# Patient Record
Sex: MALE | Race: WHITE | NOT HISPANIC OR LATINO | Employment: OTHER | URBAN - METROPOLITAN AREA
[De-identification: names, ages, dates, MRNs, and addresses within clinical notes are randomized per-mention and may not be internally consistent; named-entity substitution may affect disease eponyms.]

---

## 2017-02-10 ENCOUNTER — COMMUNICATION - HEALTHEAST (OUTPATIENT)
Dept: INTERNAL MEDICINE | Facility: CLINIC | Age: 62
End: 2017-02-10

## 2017-02-10 DIAGNOSIS — N52.9 ED (ERECTILE DYSFUNCTION): ICD-10-CM

## 2017-02-10 DIAGNOSIS — G47.00 INSOMNIA: ICD-10-CM

## 2017-02-11 ENCOUNTER — COMMUNICATION - HEALTHEAST (OUTPATIENT)
Dept: INTERNAL MEDICINE | Facility: CLINIC | Age: 62
End: 2017-02-11

## 2017-02-24 ENCOUNTER — COMMUNICATION - HEALTHEAST (OUTPATIENT)
Dept: INTERNAL MEDICINE | Facility: CLINIC | Age: 62
End: 2017-02-24

## 2017-02-24 DIAGNOSIS — I51.9 HEART DISEASE: ICD-10-CM

## 2017-03-01 ENCOUNTER — COMMUNICATION - HEALTHEAST (OUTPATIENT)
Dept: INTERNAL MEDICINE | Facility: CLINIC | Age: 62
End: 2017-03-01

## 2017-03-01 DIAGNOSIS — N52.9 ED (ERECTILE DYSFUNCTION): ICD-10-CM

## 2017-03-20 ENCOUNTER — COMMUNICATION - HEALTHEAST (OUTPATIENT)
Dept: INTERNAL MEDICINE | Facility: CLINIC | Age: 62
End: 2017-03-20

## 2017-03-20 DIAGNOSIS — I20.9 ANGINA PECTORIS (H): ICD-10-CM

## 2017-04-02 ENCOUNTER — COMMUNICATION - HEALTHEAST (OUTPATIENT)
Dept: INTERNAL MEDICINE | Facility: CLINIC | Age: 62
End: 2017-04-02

## 2017-04-02 DIAGNOSIS — N52.9 ED (ERECTILE DYSFUNCTION): ICD-10-CM

## 2017-04-03 ENCOUNTER — RECORDS - HEALTHEAST (OUTPATIENT)
Dept: ADMINISTRATIVE | Facility: OTHER | Age: 62
End: 2017-04-03

## 2017-04-10 ENCOUNTER — RECORDS - HEALTHEAST (OUTPATIENT)
Dept: ADMINISTRATIVE | Facility: OTHER | Age: 62
End: 2017-04-10

## 2017-04-13 ENCOUNTER — OFFICE VISIT - HEALTHEAST (OUTPATIENT)
Dept: INTERNAL MEDICINE | Facility: CLINIC | Age: 62
End: 2017-04-13

## 2017-04-13 ENCOUNTER — RECORDS - HEALTHEAST (OUTPATIENT)
Dept: ADMINISTRATIVE | Facility: OTHER | Age: 62
End: 2017-04-13

## 2017-04-13 DIAGNOSIS — K59.00 CONSTIPATION: ICD-10-CM

## 2017-04-13 DIAGNOSIS — R10.30 LOWER ABDOMINAL PAIN: ICD-10-CM

## 2017-04-13 ASSESSMENT — MIFFLIN-ST. JEOR: SCORE: 1477.99

## 2017-04-15 ENCOUNTER — COMMUNICATION - HEALTHEAST (OUTPATIENT)
Dept: INTERNAL MEDICINE | Facility: CLINIC | Age: 62
End: 2017-04-15

## 2017-04-15 DIAGNOSIS — I51.9 HEART DISEASE: ICD-10-CM

## 2017-04-15 DIAGNOSIS — N52.9 ED (ERECTILE DYSFUNCTION): ICD-10-CM

## 2017-04-26 ENCOUNTER — RECORDS - HEALTHEAST (OUTPATIENT)
Dept: ADMINISTRATIVE | Facility: OTHER | Age: 62
End: 2017-04-26

## 2017-04-26 ENCOUNTER — COMMUNICATION - HEALTHEAST (OUTPATIENT)
Dept: INTERNAL MEDICINE | Facility: CLINIC | Age: 62
End: 2017-04-26

## 2017-04-27 ENCOUNTER — RECORDS - HEALTHEAST (OUTPATIENT)
Dept: ADMINISTRATIVE | Facility: OTHER | Age: 62
End: 2017-04-27

## 2017-04-28 ENCOUNTER — COMMUNICATION - HEALTHEAST (OUTPATIENT)
Dept: INTERNAL MEDICINE | Facility: CLINIC | Age: 62
End: 2017-04-28

## 2017-04-28 DIAGNOSIS — N52.9 ED (ERECTILE DYSFUNCTION): ICD-10-CM

## 2017-05-01 ENCOUNTER — OFFICE VISIT - HEALTHEAST (OUTPATIENT)
Dept: INTERNAL MEDICINE | Facility: CLINIC | Age: 62
End: 2017-05-01

## 2017-05-01 DIAGNOSIS — E04.1 THYROID NODULE: ICD-10-CM

## 2017-05-01 DIAGNOSIS — I10 HTN (HYPERTENSION): ICD-10-CM

## 2017-05-01 DIAGNOSIS — R07.9 CHEST PAIN, UNSPECIFIED: ICD-10-CM

## 2017-05-01 ASSESSMENT — MIFFLIN-ST. JEOR: SCORE: 1473.46

## 2017-05-08 ENCOUNTER — HOSPITAL ENCOUNTER (OUTPATIENT)
Dept: ULTRASOUND IMAGING | Facility: HOSPITAL | Age: 62
Discharge: HOME | End: 2017-05-08
Attending: INTERNAL MEDICINE

## 2017-05-08 DIAGNOSIS — E04.1 THYROID NODULE: ICD-10-CM

## 2017-05-10 ENCOUNTER — COMMUNICATION - HEALTHEAST (OUTPATIENT)
Dept: INTERNAL MEDICINE | Facility: CLINIC | Age: 62
End: 2017-05-10

## 2017-05-24 ENCOUNTER — RECORDS - HEALTHEAST (OUTPATIENT)
Dept: ADMINISTRATIVE | Facility: OTHER | Age: 62
End: 2017-05-24

## 2017-06-20 ENCOUNTER — COMMUNICATION - HEALTHEAST (OUTPATIENT)
Dept: INTERNAL MEDICINE | Facility: CLINIC | Age: 62
End: 2017-06-20

## 2017-06-20 DIAGNOSIS — I51.9 HEART DISEASE: ICD-10-CM

## 2017-06-20 DIAGNOSIS — N52.9 ED (ERECTILE DYSFUNCTION): ICD-10-CM

## 2017-08-08 ENCOUNTER — COMMUNICATION - HEALTHEAST (OUTPATIENT)
Dept: INTERNAL MEDICINE | Facility: CLINIC | Age: 62
End: 2017-08-08

## 2017-08-08 DIAGNOSIS — N52.9 ED (ERECTILE DYSFUNCTION): ICD-10-CM

## 2017-09-13 ENCOUNTER — COMMUNICATION - HEALTHEAST (OUTPATIENT)
Dept: INTERNAL MEDICINE | Facility: CLINIC | Age: 62
End: 2017-09-13

## 2017-09-13 DIAGNOSIS — I51.9 HEART DISEASE: ICD-10-CM

## 2017-09-13 DIAGNOSIS — K59.00 CONSTIPATION: ICD-10-CM

## 2017-09-13 DIAGNOSIS — N52.9 ED (ERECTILE DYSFUNCTION): ICD-10-CM

## 2017-09-13 DIAGNOSIS — G47.00 INSOMNIA: ICD-10-CM

## 2017-11-02 ENCOUNTER — RECORDS - HEALTHEAST (OUTPATIENT)
Dept: ADMINISTRATIVE | Facility: OTHER | Age: 62
End: 2017-11-02

## 2017-12-05 ENCOUNTER — RECORDS - HEALTHEAST (OUTPATIENT)
Dept: ADMINISTRATIVE | Facility: OTHER | Age: 62
End: 2017-12-05

## 2017-12-05 ENCOUNTER — HOSPITAL ENCOUNTER (OUTPATIENT)
Dept: CT IMAGING | Facility: HOSPITAL | Age: 62
Discharge: HOME | End: 2017-12-05
Attending: INTERNAL MEDICINE

## 2017-12-05 DIAGNOSIS — R10.32 LLQ PAIN: ICD-10-CM

## 2018-02-01 ENCOUNTER — COMMUNICATION - HEALTHEAST (OUTPATIENT)
Dept: INTERNAL MEDICINE | Facility: CLINIC | Age: 63
End: 2018-02-01

## 2018-02-01 DIAGNOSIS — N52.9 ED (ERECTILE DYSFUNCTION): ICD-10-CM

## 2018-02-13 ENCOUNTER — RECORDS - HEALTHEAST (OUTPATIENT)
Dept: ADMINISTRATIVE | Facility: OTHER | Age: 63
End: 2018-02-13

## 2018-03-12 ENCOUNTER — RECORDS - HEALTHEAST (OUTPATIENT)
Dept: ADMINISTRATIVE | Facility: OTHER | Age: 63
End: 2018-03-12

## 2018-04-03 ENCOUNTER — COMMUNICATION - HEALTHEAST (OUTPATIENT)
Dept: SCHEDULING | Facility: CLINIC | Age: 63
End: 2018-04-03

## 2018-04-12 ENCOUNTER — RECORDS - HEALTHEAST (OUTPATIENT)
Dept: ADMINISTRATIVE | Facility: OTHER | Age: 63
End: 2018-04-12

## 2018-08-21 ENCOUNTER — RECORDS - HEALTHEAST (OUTPATIENT)
Dept: ADMINISTRATIVE | Facility: OTHER | Age: 63
End: 2018-08-21

## 2018-08-21 ENCOUNTER — COMMUNICATION - HEALTHEAST (OUTPATIENT)
Dept: INTERNAL MEDICINE | Facility: CLINIC | Age: 63
End: 2018-08-21

## 2018-09-04 ENCOUNTER — COMMUNICATION - HEALTHEAST (OUTPATIENT)
Dept: INTERNAL MEDICINE | Facility: CLINIC | Age: 63
End: 2018-09-04

## 2018-10-17 ENCOUNTER — COMMUNICATION - HEALTHEAST (OUTPATIENT)
Dept: INTERNAL MEDICINE | Facility: CLINIC | Age: 63
End: 2018-10-17

## 2018-10-19 RX ORDER — SILDENAFIL CITRATE 20 MG/1
TABLET ORAL
Qty: 30 TABLET | Refills: 0 | Status: SHIPPED | OUTPATIENT
Start: 2018-10-19 | End: 2023-03-20

## 2018-11-12 ENCOUNTER — COMMUNICATION - HEALTHEAST (OUTPATIENT)
Dept: INTERNAL MEDICINE | Facility: CLINIC | Age: 63
End: 2018-11-12

## 2018-11-13 ENCOUNTER — COMMUNICATION - HEALTHEAST (OUTPATIENT)
Dept: INTERNAL MEDICINE | Facility: CLINIC | Age: 63
End: 2018-11-13

## 2018-11-15 RX ORDER — DOCUSATE SODIUM 100 MG/1
CAPSULE, LIQUID FILLED ORAL
Qty: 60 CAPSULE | Refills: 0 | Status: SHIPPED | OUTPATIENT
Start: 2018-11-15 | End: 2023-03-20

## 2018-11-18 ENCOUNTER — COMMUNICATION - HEALTHEAST (OUTPATIENT)
Dept: INTERNAL MEDICINE | Facility: CLINIC | Age: 63
End: 2018-11-18

## 2018-11-19 ENCOUNTER — COMMUNICATION - HEALTHEAST (OUTPATIENT)
Dept: INTERNAL MEDICINE | Facility: CLINIC | Age: 63
End: 2018-11-19

## 2018-11-20 RX ORDER — NITROGLYCERIN 0.4 MG/1
0.4 TABLET SUBLINGUAL EVERY 5 MIN PRN
Qty: 100 TABLET | Refills: 0 | Status: SHIPPED | OUTPATIENT
Start: 2018-11-20

## 2018-11-30 ENCOUNTER — COMMUNICATION - HEALTHEAST (OUTPATIENT)
Dept: INTERNAL MEDICINE | Facility: CLINIC | Age: 63
End: 2018-11-30

## 2018-12-03 RX ORDER — FUROSEMIDE 20 MG
TABLET ORAL
Qty: 30 TABLET | Refills: 0 | Status: SHIPPED | OUTPATIENT
Start: 2018-12-03

## 2019-01-30 ENCOUNTER — COMMUNICATION - HEALTHEAST (OUTPATIENT)
Dept: INTERNAL MEDICINE | Facility: CLINIC | Age: 64
End: 2019-01-30

## 2019-03-06 ENCOUNTER — COMMUNICATION - HEALTHEAST (OUTPATIENT)
Dept: INTERNAL MEDICINE | Facility: CLINIC | Age: 64
End: 2019-03-06

## 2019-03-13 RX ORDER — TRAZODONE HYDROCHLORIDE 50 MG/1
TABLET, FILM COATED ORAL
Qty: 30 TABLET | Refills: 0 | Status: SHIPPED | OUTPATIENT
Start: 2019-03-13 | End: 2023-03-20

## 2020-02-18 ENCOUNTER — COMMUNICATION - HEALTHEAST (OUTPATIENT)
Dept: INTERNAL MEDICINE | Facility: CLINIC | Age: 65
End: 2020-02-18

## 2020-08-26 ENCOUNTER — AMBULATORY - HEALTHEAST (OUTPATIENT)
Dept: SURGERY | Facility: CLINIC | Age: 65
End: 2020-08-26

## 2020-08-26 ENCOUNTER — RECORDS - HEALTHEAST (OUTPATIENT)
Dept: ADMINISTRATIVE | Facility: OTHER | Age: 65
End: 2020-08-26

## 2020-08-26 DIAGNOSIS — Z11.59 ENCOUNTER FOR SCREENING FOR OTHER VIRAL DISEASES: ICD-10-CM

## 2021-05-26 ENCOUNTER — RECORDS - HEALTHEAST (OUTPATIENT)
Dept: ADMINISTRATIVE | Facility: CLINIC | Age: 66
End: 2021-05-26

## 2021-05-27 ENCOUNTER — RECORDS - HEALTHEAST (OUTPATIENT)
Dept: ADMINISTRATIVE | Facility: CLINIC | Age: 66
End: 2021-05-27

## 2021-05-28 ENCOUNTER — RECORDS - HEALTHEAST (OUTPATIENT)
Dept: ADMINISTRATIVE | Facility: CLINIC | Age: 66
End: 2021-05-28

## 2021-05-30 ENCOUNTER — RECORDS - HEALTHEAST (OUTPATIENT)
Dept: ADMINISTRATIVE | Facility: CLINIC | Age: 66
End: 2021-05-30

## 2021-05-30 VITALS — WEIGHT: 169 LBS | HEIGHT: 65 IN | BODY MASS INDEX: 28.16 KG/M2

## 2021-05-30 VITALS — WEIGHT: 170 LBS | BODY MASS INDEX: 28.32 KG/M2 | HEIGHT: 65 IN

## 2021-05-31 ENCOUNTER — RECORDS - HEALTHEAST (OUTPATIENT)
Dept: ADMINISTRATIVE | Facility: CLINIC | Age: 66
End: 2021-05-31

## 2021-06-06 NOTE — TELEPHONE ENCOUNTER
RN cannot approve Refill Request    RN can NOT refill this medication Protocol failed and NO refill given.         Denisse Fountain, [facility] Triage/Med Refill 2/20/2020    Requested Prescriptions   Pending Prescriptions Disp Refills     traZODone (DESYREL) 50 MG tablet [Pharmacy Med Name: TRAZODONE 50 MG TABLET] 30 tablet 0     Sig: TAKE 1 TABLET BY MOUTH AT BEDTIME AS NEEDED       Tricyclics/Misc Antidepressant/Antianxiety Meds Refill Protocol Failed - 2/18/2020  9:19 AM        Failed - PCP or prescribing provider visit in last year     Last office visit with prescriber/PCP: 5/1/2017 Shola Adkins MD OR same dept: Visit date not found OR same specialty: 5/1/2017 Shola Adkins MD  Last physical: Visit date not found Last MTM visit: Visit date not found   Next visit within 3 mo: Visit date not found  Next physical within 3 mo: Visit date not found  Prescriber OR PCP: Shola Adkins MD  Last diagnosis associated with med order: There are no diagnoses linked to this encounter.  If protocol passes may refill for 12 months if within 3 months of last provider visit (or a total of 15 months).

## 2021-06-10 NOTE — PROGRESS NOTES
[facility] Follow Up Note    Ayan Watkins   62 y.o. male    Date of Visit: 4/13/2017    Chief Complaint   Patient presents with     Follow-up     having problems with bowel     Subjective  This is a 62-year-old patient who comes in primarily because of progressively worsening constipation and lower abdominal pain.  He tells me that his symptoms actually began  about 5-6 months ago.  He thinks that the symptoms have gradually worsened.  He is now having significant problems with bowel movement even with stool softeners and laxatives.  He has not noticed any black stool or blood.  He is about 9-1/2 years since his last colonoscopy.  The abdominal discomfort is primarily in the lower abdomen bilaterally.  No other GI symptoms.    He does have known coronary disease and was seen by his cardiologist this morning.  They recommended he concentrate on losing more weight and also set up a stress test.  3 days ago he was in the emergency room with a possible foreign body in the finger.  The evaluation was negative but they placed him on an antibiotic to be on the safe side.  The finger is feeling better.    ROS A comprehensive review of systems was performed and was otherwise negative    Medications, allergies, and problem list were reviewed and updated    Exam  General Appearance:   On examination his blood pressure is 124/80.  Weight is 170 pounds and height is 65 inches.  BMI is 28.29.    Heart is in a sinus rhythm with a rate of 90 and no ectopy.    Abdomen is soft with some vague tenderness in the lower abdomen bilaterally.  No obvious masses.  No distention.    The patient is alert and oriented ×3.      Assessment/Plan  1. Constipation  Ambulatory referral for Colonoscopy   2. Lower abdominal pain  Ambulatory referral for Colonoscopy     Increased constipation and lower abdominal discomfort.  Given the situation I would recommend we go ahead with a colonoscopy now instead of waiting to  the end of the year.  We will try to schedule that and follow-up with him regarding the results.  The following high BMI interventions were performed this visit: weight monitoring    Shola Adkins MD      Current Outpatient Prescriptions on File Prior to Visit   Medication Sig     alfuzosin (UROXATRAL) 10 mg 24 hr tablet      aspirin 325 MG EC tablet      CRESTOR 5 mg tablet      DOC-Q-LACE 100 mg capsule TAKE ONE CAPSULE BY MOUTH TWO TIMES A DAY.     furosemide (LASIX) 20 MG tablet TAKE ONE TABLET BY MOUTH DAILY     metoclopramide (REGLAN) 10 MG tablet      MULTIVITS W-FE,OTHER MIN/LUT (CENTRUM SILVER ULTRA WOMEN'S ORAL) Take 1 tablet by mouth daily.     NITROSTAT 0.4 mg SL tablet TAKE 1 TABLET, SUBLINGUALLY, EVERY 5 MINUTES AS NEEDED     omega-3 acid ethyl esters (LOVAZA) 1 gram capsule TAKE 1 CAPSULE TWICE DAILY WITH MEALS     omeprazole (PRILOSEC) 20 MG capsule TAKE 1 CAPSULE IN THE MORNING, 20 MINUTES PRE-MEAL.     PRALUENT PEN 75 mg/mL PnIj      pramoxine-hydrocortisone (ANALPRAM HC) cream APPLY TOPICALLY TO THE AFFECTED AREA A THIN LAYER 2-3 TIMES A DAY AS NEEDED     RAPAFLO 8 mg cap      sildenafil (REVATIO) 20 mg tablet TAKE 2 TO 5 TABLETS 1 HOUR PRIOR, NO MORE THAN ONCE PER 24 HOURS.     tamsulosin (FLOMAX) 0.4 mg Cp24 Take 1 capsule (0.4 mg total) by mouth Daily after breakfast.     traMADol (ULTRAM) 50 mg tablet TAKE 1 TABLET EVERY 6 HOURS AS NEEDED     traZODone (DESYREL) 50 MG tablet TAKE 1 TABLET BY MOUTH AT BEDTIME     [DISCONTINUED] losartan (COZAAR) 25 MG tablet      No current facility-administered medications on file prior to visit.      Allergies   Allergen Reactions     Atorvastatin Myalgia     foot and ankle pain     Influenza Virus Vaccines Unknown     Pt. unaware of reaction.      Metoprolol Unknown     Fatigue, tolerated for CT coronary angiogram 5/10/16     Ondansetron Hives     When given IV     Social History   Substance Use Topics     Smoking status: Never Smoker     Smokeless  tobacco: Never Used     Alcohol use None      Comment: 2-3 drinks per week

## 2021-06-10 NOTE — PROGRESS NOTES
[facility] Follow Up Note    Ayan Watkins   62 y.o. male    Date of Visit: 5/1/2017    Chief Complaint   Patient presents with     Follow-up     blood presure     Subjective  This is a 62-year-old patient who is in for follow-up to a recent ER visit made at [facility].  He went in with sudden onset of some chest discomfort.  He has had a history of coronary disease and was also noted to have marked elevation of his blood pressure.  I have had the opportunity to review the emergency department notes and the workup was mostly unremarkable.  There was no evidence of any acute cardiac syndrome.  It was noted at that time and confirmed by the patient today that he had been off of his blood pressure medications for about 3 days prior to doing a colonoscopy.  He has now been back on his medication.  We are awaiting the results from the colonoscopy.  During the course of the emergency room evaluation a scan was done which revealed a nodule in the thyroid area and it was suggested that he have an ultrasound done.  He has no particular symptoms relative to the thyroid.  He is otherwise feeling pretty much his usual self again.    ROS A comprehensive review of systems was performed and was otherwise negative    Medications, allergies, and problem list were reviewed and updated    Exam  General Appearance:   On examination today his blood pressure is improved at 146/80.  Weight is 169 pounds and height is 65 inches.  BMI is 28.12.    Lungs are clear.    Heart is in a sinus rhythm with a rate of 108 and no ectopy.    No abnormalities palpated over the thyroid gland.    The patient is alert and oriented ×3.      Assessment/Plan  1. HTN (hypertension)     2. Chest Pain     3. Thyroid nodule  US Thyroid     Chest pain.  ER evaluation was negative.  This may have been related to stress and his elevated blood pressure.  He is now asymptomatic.    Hypertension.  I suspect the elevated  blood pressure was due to his being off of medications.  It is now back down and he will continue his usual medicines.    Thyroid nodule on scan.  We will do an ultrasound of the thyroid and follow-up with him regarding the results.    We will also follow-up with him regarding the colonoscopy results.    Total time of this office visit was 25 minutes with greater than 50% of the time spent in care coordination and patient counseling.  The following high BMI interventions were performed this visit: weight monitoring    Shola Adkins MD      Current Outpatient Prescriptions on File Prior to Visit   Medication Sig     alfuzosin (UROXATRAL) 10 mg 24 hr tablet      aspirin 325 MG EC tablet      CRESTOR 5 mg tablet      DOC-Q-LACE 100 mg capsule TAKE ONE CAPSULE BY MOUTH TWO TIMES A DAY.     furosemide (LASIX) 20 MG tablet TAKE ONE TABLET BY MOUTH DAILY     losartan (COZAAR) 50 MG tablet Take 50 mg by mouth once daily.     metoclopramide (REGLAN) 10 MG tablet      multivitamin (ONE A DAY) per tablet Take 1 tablet by mouth.     MULTIVITS W-FE,OTHER MIN/LUT (CENTRUM SILVER ULTRA WOMEN'S ORAL) Take 1 tablet by mouth daily.     NITROSTAT 0.4 mg SL tablet TAKE 1 TABLET, SUBLINGUALLY, EVERY 5 MINUTES AS NEEDED     omega-3 acid ethyl esters (LOVAZA) 1 gram capsule TAKE 1 CAPSULE TWICE DAILY WITH MEALS     omeprazole (PRILOSEC) 20 MG capsule TAKE 1 CAPSULE IN THE MORNING, 20 MINUTES PRE-MEAL.     PRALUENT PEN 75 mg/mL PnIj      pramoxine-hydrocortisone (ANALPRAM HC) cream APPLY TOPICALLY TO THE AFFECTED AREA A THIN LAYER 2-3 TIMES A DAY AS NEEDED     RAPAFLO 8 mg cap      sildenafil (REVATIO) 20 mg tablet TAKE 2 TO 5 TABLETS 1 HOUR PRIOR, NO MORE THAN ONCE PER 24 HOURS.     tamsulosin (FLOMAX) 0.4 mg Cp24 Take 1 capsule (0.4 mg total) by mouth Daily after breakfast.     traMADol (ULTRAM) 50 mg tablet TAKE 1 TABLET EVERY 6 HOURS AS NEEDED     traZODone (DESYREL) 50 MG tablet TAKE 1 TABLET BY MOUTH AT BEDTIME     No current  facility-administered medications on file prior to visit.      Allergies   Allergen Reactions     Atorvastatin Myalgia     foot and ankle pain     Influenza Virus Vaccines Unknown     Pt. unaware of reaction.      Metoprolol Unknown     Fatigue, tolerated for CT coronary angiogram 5/10/16     Ondansetron Hives     When given IV     Social History   Substance Use Topics     Smoking status: Never Smoker     Smokeless tobacco: Never Used     Alcohol use Not on file      Comment: 2-3 drinks per week

## 2021-06-16 PROBLEM — R00.0 TACHYCARDIA: Status: ACTIVE | Noted: 2018-04-03

## 2021-06-16 PROBLEM — R19.7 NAUSEA VOMITING AND DIARRHEA: Status: ACTIVE | Noted: 2018-04-03

## 2021-06-16 PROBLEM — R11.2 NAUSEA VOMITING AND DIARRHEA: Status: ACTIVE | Noted: 2018-04-03

## 2021-06-16 PROBLEM — R50.9 FEBRILE ILLNESS, ACUTE: Status: ACTIVE | Noted: 2018-04-04

## 2021-06-16 PROBLEM — I10 BENIGN ESSENTIAL HTN: Status: ACTIVE | Noted: 2018-04-03

## 2021-06-16 PROBLEM — D69.6 THROMBOCYTOPENIA (H): Status: ACTIVE | Noted: 2018-04-04

## 2021-06-16 PROBLEM — R65.10 SIRS (SYSTEMIC INFLAMMATORY RESPONSE SYNDROME) (H): Status: ACTIVE | Noted: 2018-04-04

## 2021-06-24 NOTE — TELEPHONE ENCOUNTER
RN cannot approve Refill Request    RN can NOT refill this medication overdue for office visits and/or labs.    Ayan Mariano, [facility] Triage/Med Refill 3/12/2019    Requested Prescriptions   Pending Prescriptions Disp Refills     traZODone (DESYREL) 50 MG tablet [Pharmacy Med Name: TRAZODONE 50 MG TABLET] 30 tablet 0     Sig: TAKE 1 TABLET BY MOUTH AT BEDTIME AS NEEDED    Tricyclics/Misc Antidepressant/Antianxiety Meds Refill Protocol Failed - 3/6/2019  1:38 PM       Failed - PCP or prescribing provider visit in last year    Last office visit with prescriber/PCP: 11/22/2016 Daniel Lin MD OR same dept: Visit date not found OR same specialty: 5/1/2017 Shola Adkins MD  Last physical: Visit date not found Last MTM visit: Visit date not found   Next visit within 3 mo: Visit date not found  Next physical within 3 mo: Visit date not found  Prescriber OR PCP: Daniel Lin MD  Last diagnosis associated with med order: There are no diagnoses linked to this encounter.  If protocol passes may refill for 12 months if within 3 months of last provider visit (or a total of 15 months).

## 2022-01-10 ENCOUNTER — TRANSFERRED RECORDS (OUTPATIENT)
Dept: HEALTH INFORMATION MANAGEMENT | Facility: CLINIC | Age: 67
End: 2022-01-10
Payer: COMMERCIAL

## 2022-02-12 ENCOUNTER — APPOINTMENT (OUTPATIENT)
Dept: CT IMAGING | Facility: HOSPITAL | Age: 67
End: 2022-02-12
Attending: FAMILY MEDICINE
Payer: COMMERCIAL

## 2022-02-12 ENCOUNTER — HOSPITAL ENCOUNTER (EMERGENCY)
Facility: HOSPITAL | Age: 67
Discharge: HOME | End: 2022-02-12
Attending: FAMILY MEDICINE | Admitting: FAMILY MEDICINE
Payer: COMMERCIAL

## 2022-02-12 VITALS
RESPIRATION RATE: 36 BRPM | SYSTOLIC BLOOD PRESSURE: 146 MMHG | BODY MASS INDEX: 29.99 KG/M2 | WEIGHT: 180 LBS | HEIGHT: 65 IN | OXYGEN SATURATION: 95 % | TEMPERATURE: 98.1 F | HEART RATE: 102 BPM | DIASTOLIC BLOOD PRESSURE: 95 MMHG

## 2022-02-12 DIAGNOSIS — R06.02 SHORTNESS OF BREATH: ICD-10-CM

## 2022-02-12 DIAGNOSIS — F10.920 ALCOHOLIC INTOXICATION WITHOUT COMPLICATION (H): ICD-10-CM

## 2022-02-12 LAB
ANION GAP SERPL CALCULATED.3IONS-SCNC: 16 MMOL/L (ref 5–18)
BASE EXCESS BLDV CALC-SCNC: -4.8 MMOL/L
BASOPHILS # BLD AUTO: 0.1 10E3/UL (ref 0–0.2)
BASOPHILS NFR BLD AUTO: 1 %
BNP SERPL-MCNC: 13 PG/ML (ref 0–60)
BUN SERPL-MCNC: 16 MG/DL (ref 8–22)
CALCIUM SERPL-MCNC: 8.8 MG/DL (ref 8.5–10.5)
CHLORIDE BLD-SCNC: 105 MMOL/L (ref 98–107)
CO2 SERPL-SCNC: 19 MMOL/L (ref 22–31)
CREAT SERPL-MCNC: 0.77 MG/DL (ref 0.7–1.3)
EOSINOPHIL # BLD AUTO: 0.1 10E3/UL (ref 0–0.7)
EOSINOPHIL NFR BLD AUTO: 1 %
ERYTHROCYTE [DISTWIDTH] IN BLOOD BY AUTOMATED COUNT: 13.9 % (ref 10–15)
ETHANOL SERPL-MCNC: 157 MG/DL
FLUAV RNA SPEC QL NAA+PROBE: NEGATIVE
FLUBV RNA RESP QL NAA+PROBE: NEGATIVE
GFR SERPL CREATININE-BSD FRML MDRD: >90 ML/MIN/1.73M2
GLUCOSE BLD-MCNC: 124 MG/DL (ref 70–125)
HCO3 BLDV-SCNC: 21 MMOL/L (ref 24–30)
HCT VFR BLD AUTO: 47.2 % (ref 40–53)
HGB BLD-MCNC: 16 G/DL (ref 13.3–17.7)
HOLD SPECIMEN: NORMAL
IMM GRANULOCYTES # BLD: 0.2 10E3/UL
IMM GRANULOCYTES NFR BLD: 2 %
LYMPHOCYTES # BLD AUTO: 2.2 10E3/UL (ref 0.8–5.3)
LYMPHOCYTES NFR BLD AUTO: 26 %
MAGNESIUM SERPL-MCNC: 2.3 MG/DL (ref 1.8–2.6)
MCH RBC QN AUTO: 31.1 PG (ref 26.5–33)
MCHC RBC AUTO-ENTMCNC: 33.9 G/DL (ref 31.5–36.5)
MCV RBC AUTO: 92 FL (ref 78–100)
MONOCYTES # BLD AUTO: 0.8 10E3/UL (ref 0–1.3)
MONOCYTES NFR BLD AUTO: 10 %
NEUTROPHILS # BLD AUTO: 5.2 10E3/UL (ref 1.6–8.3)
NEUTROPHILS NFR BLD AUTO: 60 %
NRBC # BLD AUTO: 0 10E3/UL
NRBC BLD AUTO-RTO: 0 /100
OXYHGB MFR BLDV: 95.8 % (ref 70–75)
PCO2 BLDV: 30 MM HG (ref 35–50)
PH BLDV: 7.43 [PH] (ref 7.35–7.45)
PLATELET # BLD AUTO: 175 10E3/UL (ref 150–450)
PO2 BLDV: 84 MM HG (ref 25–47)
POTASSIUM BLD-SCNC: 3.9 MMOL/L (ref 3.5–5)
RBC # BLD AUTO: 5.15 10E6/UL (ref 4.4–5.9)
SAO2 % BLDV: 96.5 % (ref 70–75)
SARS-COV-2 RNA RESP QL NAA+PROBE: NEGATIVE
SODIUM SERPL-SCNC: 140 MMOL/L (ref 136–145)
TROPONIN I SERPL-MCNC: 0.02 NG/ML (ref 0–0.29)
TROPONIN I SERPL-MCNC: <0.01 NG/ML (ref 0–0.29)
WBC # BLD AUTO: 8.4 10E3/UL (ref 4–11)

## 2022-02-12 PROCEDURE — 250N000011 HC RX IP 250 OP 636: Performed by: FAMILY MEDICINE

## 2022-02-12 PROCEDURE — 83735 ASSAY OF MAGNESIUM: CPT | Performed by: FAMILY MEDICINE

## 2022-02-12 PROCEDURE — 87636 SARSCOV2 & INF A&B AMP PRB: CPT | Performed by: FAMILY MEDICINE

## 2022-02-12 PROCEDURE — C9803 HOPD COVID-19 SPEC COLLECT: HCPCS

## 2022-02-12 PROCEDURE — 36415 COLL VENOUS BLD VENIPUNCTURE: CPT | Performed by: STUDENT IN AN ORGANIZED HEALTH CARE EDUCATION/TRAINING PROGRAM

## 2022-02-12 PROCEDURE — 85025 COMPLETE CBC W/AUTO DIFF WBC: CPT | Performed by: FAMILY MEDICINE

## 2022-02-12 PROCEDURE — 80048 BASIC METABOLIC PNL TOTAL CA: CPT | Performed by: FAMILY MEDICINE

## 2022-02-12 PROCEDURE — 82805 BLOOD GASES W/O2 SATURATION: CPT | Performed by: FAMILY MEDICINE

## 2022-02-12 PROCEDURE — 84484 ASSAY OF TROPONIN QUANT: CPT | Performed by: FAMILY MEDICINE

## 2022-02-12 PROCEDURE — 99285 EMERGENCY DEPT VISIT HI MDM: CPT | Mod: 25

## 2022-02-12 PROCEDURE — 36415 COLL VENOUS BLD VENIPUNCTURE: CPT | Performed by: FAMILY MEDICINE

## 2022-02-12 PROCEDURE — 83880 ASSAY OF NATRIURETIC PEPTIDE: CPT | Performed by: FAMILY MEDICINE

## 2022-02-12 PROCEDURE — 84484 ASSAY OF TROPONIN QUANT: CPT | Mod: 91 | Performed by: FAMILY MEDICINE

## 2022-02-12 PROCEDURE — 71275 CT ANGIOGRAPHY CHEST: CPT

## 2022-02-12 PROCEDURE — 82077 ASSAY SPEC XCP UR&BREATH IA: CPT | Performed by: FAMILY MEDICINE

## 2022-02-12 RX ORDER — IOPAMIDOL 755 MG/ML
100 INJECTION, SOLUTION INTRAVASCULAR ONCE
Status: COMPLETED | OUTPATIENT
Start: 2022-02-12 | End: 2022-02-12

## 2022-02-12 RX ADMIN — IOPAMIDOL 100 ML: 755 INJECTION, SOLUTION INTRAVENOUS at 02:56

## 2022-02-12 ASSESSMENT — MIFFLIN-ST. JEOR: SCORE: 1523.35

## 2022-02-12 NOTE — ED PROVIDER NOTES
EMERGENCY DEPARTMENT ENCOUNTER      NAME: Ayan Watkins  AGE: 66 year old male  YOB: 1955  MRN: [id number]  EVALUATION DATE & TIME: 2/12/2022  1:01 AM    PCP: Tavo Stout    ED PROVIDER: Boom Diamond M.D.    Chief Complaint   Patient presents with     Shortness of Breath       FINAL IMPRESSION:  1. Shortness of breath    2. Alcoholic intoxication without complication (H)        ED COURSE & MEDICAL DECISION MAKING:    Pertinent Labs & Imaging studies personally reviewed and interpreted by me. (See chart for details)  1:10 AM Patient seen and examined, prior records reviewed.  Differential diagnosis includes but not limited to COPD, asthma, CHF, pneumonia, bronchitis, pneumothorax, myocardial infarction, pulmonary embolism, anxiety.  Patient with shortness of breath about 90 minutes prior to arrival, no accompanying symptoms.  In the emergency department, tachycardic but no respiratory distress or increased work of breathing.  Poor air movement bilaterally but without wheezes or crackles.  Given tachycardia and abrupt onset, CT PE is ordered.  2:24 AM labs so far reassuring, CBC does not have any acute abnormalities, venous gas with pH of 7.43 and PCO2 of 30.  Covid and influenza negative.  Remaining labs are pending, when these are back patient will be sent to CT.  Heart rate has improved.  3:47 AM patient remains comfortable.  CT scan of the chest is negative for acute intrathoracic findings, no pulmonary embolism.  Troponin 0 0.02, we will recheck to make sure that his shortness of breath is not an anginal equivalent.  Otherwise, will wean oxygen if patient remains comfortable can be discharged.  Patient feels better, resting comfortably.  Oxygen was turned off and will continue to monitor.  Repeat troponin ordered.  4:58 AM I updated the patient with his results and discussed discharge plans along with return precautions. Patient was agreeable with plan.  Patient with shortness of  breath, no hypoxia, lungs clear.  No findings for acute coronary syndrome, myocardial infarction, pulmonary embolism, pneumonia, bronchitis, heart failure, pericardial effusion.  Given absence of hypoxemia normal lung sounds on initial exam, certainly this could be related to anxiety plus alcohol intoxication, no indication of acute emergent cardiopulmonary condition.       At the conclusion of the encounter I discussed the results of all of the tests and the disposition. The questions were answered. The patient or family acknowledged understanding and was agreeable with the care plan.     PROCEDURES:   Procedures    MEDICATIONS GIVEN IN THE EMERGENCY:  Medications   iopamidol (ISOVUE-370) solution 100 mL (100 mLs Intravenous Given 2/12/22 0256)       NEW PRESCRIPTIONS STARTED AT TODAY'S ER VISIT  Discharge Medication List as of 2/12/2022  4:53 AM          =================================================================    HPI    Patient information was obtained from: patient       Ayan Watkins is a 66 year old male with a pertinent history of CAD, hyperlipidemia, hypertension, MI, who presents to this ED via EMS for evaluation of shortness of breath.  Patient had abrupt onset of shortness of breath while he is laying down about 90 minutes prior to coming the emergency department.  No cough, fever, runny nose.  No chest pain.  No leg swelling.  No nausea, vomiting, diarrhea, or abdominal pain.  Took 2 nitroglycerin with no relief.  Patient is primary caregiver from his mother who has had a stroke, also working.  Denies smoking, denies recent illness.    Review of care everywhere shows the patient had recent evaluation for dyspnea on exertion at [facility], echocardiogram demonstrated mild increased wall thickness and mild global systolic dysfunction with hypokinesis of the anterior and mid septum.  Coronary CTA without evidence of acute coronary syndrome.    REVIEW OF SYSTEMS   Review of Systems   All other  systems reviewed and negative    PAST MEDICAL HISTORY:  History reviewed. No pertinent past medical history.    PAST SURGICAL HISTORY:  History reviewed. No pertinent surgical history.    CURRENT MEDICATIONS:    No current facility-administered medications for this encounter.     Current Outpatient Medications   Medication     acetaminophen (TYLENOL) 325 MG tablet     aspirin 81 MG EC tablet     docusate sodium (COLACE) 100 MG capsule     furosemide (LASIX) 20 MG tablet     losartan (COZAAR) 100 MG tablet     multivitamin (ONE A DAY) per tablet     nitroglycerin (NITROSTAT) 0.4 MG SL tablet     omeprazole (PRILOSEC) 20 MG capsule     PRALUENT PEN 75 mg/mL PnIj     rosuvastatin (CRESTOR) 5 MG tablet     sildenafil, antihypertensive, (REVATIO) 20 mg tablet     traZODone (DESYREL) 50 MG tablet       ALLERGIES:  Allergies   Allergen Reactions     Atorvastatin Muscle Pain (Myalgia)     foot and ankle pain     Influenza Virus Vaccines [Influenza Vaccines] Unknown     Pt. unaware of reaction.      Metoprolol Unknown     Fatigue, tolerated for CT coronary angiogram 5/10/16     Ondansetron Hives     When given IV       FAMILY HISTORY:  History reviewed. No pertinent family history.    SOCIAL HISTORY:   Social History     Socioeconomic History     Marital status: Single     Spouse name: Not on file     Number of children: Not on file     Years of education: Not on file     Highest education level: Not on file   Occupational History     Not on file   Tobacco Use     Smoking status: Never Smoker     Smokeless tobacco: Never Used   Substance and Sexual Activity     Alcohol use: Yes     Comment: Alcoholic Drinks/day: 2-3 drinks per week     Drug use: No     Sexual activity: Not Currently   Other Topics Concern     Not on file   Social History Narrative     Not on file     Social Determinants of Health     Financial Resource Strain: Not on file   Food Insecurity: Not on file   Transportation Needs: Not on file   Physical Activity:  "Not on file   Stress: Not on file   Social Connections: Not on file   Intimate Partner Violence: Not on file   Housing Stability: Not on file       VITALS:  BP (!) 146/95   Pulse 102   Temp 98.1  F (36.7  C) (Oral)   Resp (!) 36   Ht 1.651 m (5' 5\")   Wt 81.6 kg (180 lb)   SpO2 95%   BMI 29.95 kg/m      PHYSICAL EXAM:  Physical Exam  Vitals and nursing note reviewed.   Constitutional:       Appearance: Normal appearance.   HENT:      Head: Normocephalic and atraumatic.      Right Ear: External ear normal.      Left Ear: External ear normal.      Nose: Nose normal.      Mouth/Throat:      Mouth: Mucous membranes are moist.   Eyes:      Extraocular Movements: Extraocular movements intact.      Conjunctiva/sclera: Conjunctivae normal.      Pupils: Pupils are equal, round, and reactive to light.   Cardiovascular:      Rate and Rhythm: Normal rate and regular rhythm.   Pulmonary:      Effort: Pulmonary effort is normal.      Breath sounds: Decreased breath sounds present. No wheezing or rales.   Abdominal:      General: Abdomen is flat. There is no distension.      Palpations: Abdomen is soft.      Tenderness: There is no abdominal tenderness. There is no guarding.   Musculoskeletal:         General: Normal range of motion.      Cervical back: Normal range of motion and neck supple.      Right lower leg: No edema.      Left lower leg: No edema.   Lymphadenopathy:      Cervical: No cervical adenopathy.   Skin:     General: Skin is warm and dry.   Neurological:      General: No focal deficit present.      Mental Status: He is alert and oriented to person, place, and time. Mental status is at baseline.      Comments: No gross focal neurologic deficits   Psychiatric:         Mood and Affect: Mood normal.         Behavior: Behavior normal.         Thought Content: Thought content normal.          LAB:  All pertinent labs reviewed and interpreted.  Results for orders placed or performed during the hospital encounter of " 02/12/22   CT Chest Pulmonary Embolism w Contrast    Impression    IMPRESSION:  1.  No pulmonary embolus, aortic dissection, or aneurysm.  2.   Status post median sternotomy and CABG.  3.  Fatty liver.   Extra Blue Top Tube   Result Value Ref Range    Hold Specimen JIC    Extra Red Top Tube   Result Value Ref Range    Hold Specimen JIC    Extra Green Top (Lithium Heparin) Tube   Result Value Ref Range    Hold Specimen JIC    Extra Purple Top Tube   Result Value Ref Range    Hold Specimen JIC    Extra Green Top (Lithium Heparin) ON ICE   Result Value Ref Range    Hold Specimen JIC    Basic metabolic panel   Result Value Ref Range    Sodium 140 136 - 145 mmol/L    Potassium 3.9 3.5 - 5.0 mmol/L    Chloride 105 98 - 107 mmol/L    Carbon Dioxide (CO2) 19 (L) 22 - 31 mmol/L    Anion Gap 16 5 - 18 mmol/L    Urea Nitrogen 16 8 - 22 mg/dL    Creatinine 0.77 0.70 - 1.30 mg/dL    Calcium 8.8 8.5 - 10.5 mg/dL    Glucose 124 70 - 125 mg/dL    GFR Estimate >90 >60 mL/min/1.73m2   Result Value Ref Range    Magnesium 2.3 1.8 - 2.6 mg/dL   B-Type Natriuretic Peptide ([facility] Only)   Result Value Ref Range    BNP 13 0 - 60 pg/mL   Result Value Ref Range    Troponin I 0.02 0.00 - 0.29 ng/mL   Blood gas venous   Result Value Ref Range    pH Venous 7.43 7.35 - 7.45    pCO2 Venous 30 (L) 35 - 50 mm Hg    pO2 Venous 84 (H) 25 - 47 mm Hg    Bicarbonate Venous 21 (L) 24 - 30 mmol/L    Base Excess/Deficit (+/-) -4.8   mmol/L    Oxyhemoglobin Venous 95.8 (H) 70.0 - 75.0 %    O2 Sat, Venous 96.5 (H) 70.0 - 75.0 %   CBC with platelets and differential   Result Value Ref Range    WBC Count 8.4 4.0 - 11.0 10e3/uL    RBC Count 5.15 4.40 - 5.90 10e6/uL    Hemoglobin 16.0 13.3 - 17.7 g/dL    Hematocrit 47.2 40.0 - 53.0 %    MCV 92 78 - 100 fL    MCH 31.1 26.5 - 33.0 pg    MCHC 33.9 31.5 - 36.5 g/dL    RDW 13.9 10.0 - 15.0 %    Platelet Count 175 150 - 450 10e3/uL    % Neutrophils 60 %    % Lymphocytes 26 %    % Monocytes 10 %    % Eosinophils 1 %     % Basophils 1 %    % Immature Granulocytes 2 %    NRBCs per 100 WBC 0 <1 /100    Absolute Neutrophils 5.2 1.6 - 8.3 10e3/uL    Absolute Lymphocytes 2.2 0.8 - 5.3 10e3/uL    Absolute Monocytes 0.8 0.0 - 1.3 10e3/uL    Absolute Eosinophils 0.1 0.0 - 0.7 10e3/uL    Absolute Basophils 0.1 0.0 - 0.2 10e3/uL    Absolute Immature Granulocytes 0.2 <=0.4 10e3/uL    Absolute NRBCs 0.0 10e3/uL   Symptomatic; Unknown Influenza A/B & SARS-CoV2 (COVID-19) Virus PCR Multiplex Nasopharyngeal    Specimen: Nasopharyngeal; Swab   Result Value Ref Range    Influenza A PCR Negative Negative    Influenza B PCR Negative Negative    SARS CoV2 PCR Negative Negative   Alcohol level blood   Result Value Ref Range    Alcohol, Blood 157 (H) None detected mg/dL   Result Value Ref Range    Troponin I <0.01 0.00 - 0.29 ng/mL       RADIOLOGY:  Reviewed all pertinent imaging. Please see official radiology report.  CT Chest Pulmonary Embolism w Contrast   Final Result   IMPRESSION:   1.  No pulmonary embolus, aortic dissection, or aneurysm.   2.   Status post median sternotomy and CABG.   3.  Fatty liver.          EKG:    Performed at: 1:07 AM  Impression: Sinus tachycardia, no acute ischemic changes  Rate: 104  Rhythm: Sinus  Axis: Normal  VA Interval: 134  QRS Interval: 84  QTc Interval: 450  ST Changes: No acute ischemic change  Comparison: No prior for comparison    I have independently reviewed and interpreted the EKG(s) documented above.    I, Kay Nuñez, am serving as a scribe to document services personally performed by Dr. Diamond based on my observation and the provider's statements to me. I, Boom Diamond MD attest that Kay Nuñez is acting in a scribe capacity, has observed my performance of the services and has documented them in accordance with my direction.    Boom Diamond M.D.  Emergency Medicine  [facility] EMERGENCY DEPARTMENT  [facility] [place]  [place] [address]  [phone]  Dept: [phone]     Boom Diamond MD  02/12/22 0629

## 2022-02-12 NOTE — ED NOTES
Bed: JNED-05  Expected date: 2/12/22  Expected time: 12:52 AM  Means of arrival: Ambulance  Comments:  WBL 65yo m sob

## 2022-02-12 NOTE — ED TRIAGE NOTES
"Patient c/o sudden onset of Shortness of breath around 12:10 am , denied chest pain but took,\"nitroglycerin\" without relief.  Sats 93% on RA , Medic gave 324 ASA .   "

## 2022-10-31 ENCOUNTER — TRANSFERRED RECORDS (OUTPATIENT)
Dept: HEALTH INFORMATION MANAGEMENT | Facility: CLINIC | Age: 67
End: 2022-10-31

## 2023-03-19 ENCOUNTER — HOSPITAL ENCOUNTER (OUTPATIENT)
Facility: HOSPITAL | Age: 68
Setting detail: OBSERVATION
Discharge: HOME | End: 2023-03-21
Attending: EMERGENCY MEDICINE | Admitting: EMERGENCY MEDICINE
Payer: COMMERCIAL

## 2023-03-19 DIAGNOSIS — R11.2 INTRACTABLE NAUSEA AND VOMITING: ICD-10-CM

## 2023-03-19 PROBLEM — K64.9 HEMORRHOIDS: Status: ACTIVE | Noted: 2017-04-26

## 2023-03-19 PROBLEM — D12.5 BENIGN NEOPLASM OF SIGMOID COLON: Status: ACTIVE | Noted: 2017-04-28

## 2023-03-19 PROBLEM — K63.5 POLYP OF COLON: Status: ACTIVE | Noted: 2017-04-26

## 2023-03-19 PROBLEM — I10 HTN (HYPERTENSION): Status: ACTIVE | Noted: 2023-03-19

## 2023-03-19 PROBLEM — R73.03 PREDIABETES: Status: ACTIVE | Noted: 2022-09-19

## 2023-03-19 PROBLEM — D12.3 BENIGN NEOPLASM OF TRANSVERSE COLON: Status: ACTIVE | Noted: 2017-04-28

## 2023-03-19 PROBLEM — H57.10 EYE PAIN: Status: ACTIVE | Noted: 2023-03-19

## 2023-03-19 PROBLEM — R19.4 CHANGE IN BOWEL HABIT: Status: ACTIVE | Noted: 2018-02-22

## 2023-03-19 PROBLEM — Z86.19 HISTORY OF HEPATITIS B: Status: ACTIVE | Noted: 2017-12-05

## 2023-03-19 PROBLEM — K57.30 DIVERTICULAR DISEASE OF LARGE INTESTINE: Status: ACTIVE | Noted: 2017-04-26

## 2023-03-19 PROBLEM — R07.9 CHEST PAIN: Status: ACTIVE | Noted: 2023-03-19

## 2023-03-19 PROBLEM — R10.32 LEFT LOWER QUADRANT PAIN: Status: ACTIVE | Noted: 2017-12-05

## 2023-03-19 PROBLEM — G47.00 INSOMNIA: Status: ACTIVE | Noted: 2023-03-19

## 2023-03-19 PROBLEM — R19.8 IRREGULAR BOWEL HABITS: Status: ACTIVE | Noted: 2017-04-28

## 2023-03-19 PROBLEM — K76.0 STEATOSIS OF LIVER: Status: ACTIVE | Noted: 2018-03-02

## 2023-03-19 PROBLEM — K59.00 CONSTIPATION: Status: ACTIVE | Noted: 2018-03-02

## 2023-03-19 PROBLEM — N52.9 ED (ERECTILE DYSFUNCTION): Status: ACTIVE | Noted: 2023-03-19

## 2023-03-19 PROBLEM — R10.11 RIGHT UPPER QUADRANT PAIN: Status: ACTIVE | Noted: 2018-03-02

## 2023-03-19 PROBLEM — R79.89 ABNORMAL LIVER FUNCTION TESTS: Status: ACTIVE | Noted: 2017-12-06

## 2023-03-19 PROBLEM — E78.5 HYPERLIPIDEMIA: Status: ACTIVE | Noted: 2023-03-19

## 2023-03-19 PROBLEM — K76.0 NONALCOHOLIC FATTY LIVER DISEASE: Status: ACTIVE | Noted: 2018-03-02

## 2023-03-19 PROBLEM — F41.9 ANXIETY: Status: ACTIVE | Noted: 2023-03-19

## 2023-03-19 PROBLEM — E04.1 THYROID NODULE: Status: ACTIVE | Noted: 2023-03-19

## 2023-03-19 PROBLEM — R10.9 ABDOMINAL PAIN: Status: ACTIVE | Noted: 2018-02-22

## 2023-03-19 PROBLEM — Z86.0100 PERSONAL HISTORY OF COLONIC POLYPS: Status: ACTIVE | Noted: 2017-12-05

## 2023-03-19 PROBLEM — J01.90 ACUTE SINUSITIS: Status: ACTIVE | Noted: 2023-03-19

## 2023-03-19 PROCEDURE — C9803 HOPD COVID-19 SPEC COLLECT: HCPCS

## 2023-03-19 PROCEDURE — 99285 EMERGENCY DEPT VISIT HI MDM: CPT | Mod: 25

## 2023-03-20 ENCOUNTER — APPOINTMENT (OUTPATIENT)
Dept: CT IMAGING | Facility: HOSPITAL | Age: 68
End: 2023-03-20
Attending: INTERNAL MEDICINE
Payer: COMMERCIAL

## 2023-03-20 ENCOUNTER — APPOINTMENT (OUTPATIENT)
Dept: RADIOLOGY | Facility: HOSPITAL | Age: 68
End: 2023-03-20
Attending: INTERNAL MEDICINE
Payer: COMMERCIAL

## 2023-03-20 LAB
ALBUMIN SERPL BCG-MCNC: 4.1 G/DL (ref 3.5–5.2)
ALP SERPL-CCNC: 57 U/L (ref 40–129)
ALT SERPL W P-5'-P-CCNC: 135 U/L (ref 10–50)
ANION GAP SERPL CALCULATED.3IONS-SCNC: 13 MMOL/L (ref 7–15)
ANION GAP SERPL CALCULATED.3IONS-SCNC: 13 MMOL/L (ref 7–15)
AST SERPL W P-5'-P-CCNC: 286 U/L (ref 10–50)
BILIRUB DIRECT SERPL-MCNC: 0.27 MG/DL (ref 0–0.3)
BILIRUB SERPL-MCNC: 0.7 MG/DL
BUN SERPL-MCNC: 16.1 MG/DL (ref 8–23)
BUN SERPL-MCNC: 19.1 MG/DL (ref 8–23)
CALCIUM SERPL-MCNC: 8.5 MG/DL (ref 8.8–10.2)
CALCIUM SERPL-MCNC: 9.1 MG/DL (ref 8.8–10.2)
CHLORIDE SERPL-SCNC: 101 MMOL/L (ref 98–107)
CHLORIDE SERPL-SCNC: 106 MMOL/L (ref 98–107)
CREAT SERPL-MCNC: 0.84 MG/DL (ref 0.67–1.17)
CREAT SERPL-MCNC: 0.85 MG/DL (ref 0.67–1.17)
DEPRECATED HCO3 PLAS-SCNC: 22 MMOL/L (ref 22–29)
DEPRECATED HCO3 PLAS-SCNC: 22 MMOL/L (ref 22–29)
ERYTHROCYTE [DISTWIDTH] IN BLOOD BY AUTOMATED COUNT: 13.2 % (ref 10–15)
ETHANOL SERPL-MCNC: <0.01 G/DL
GFR SERPL CREATININE-BSD FRML MDRD: >90 ML/MIN/1.73M2
GFR SERPL CREATININE-BSD FRML MDRD: >90 ML/MIN/1.73M2
GLUCOSE SERPL-MCNC: 144 MG/DL (ref 70–99)
GLUCOSE SERPL-MCNC: 156 MG/DL (ref 70–99)
HCT VFR BLD AUTO: 50.2 % (ref 40–53)
HGB BLD-MCNC: 17.1 G/DL (ref 13.3–17.7)
MCH RBC QN AUTO: 30.1 PG (ref 26.5–33)
MCHC RBC AUTO-ENTMCNC: 34.1 G/DL (ref 31.5–36.5)
MCV RBC AUTO: 88 FL (ref 78–100)
PLATELET # BLD AUTO: 173 10E3/UL (ref 150–450)
POTASSIUM SERPL-SCNC: 3.4 MMOL/L (ref 3.4–5.3)
POTASSIUM SERPL-SCNC: 3.4 MMOL/L (ref 3.4–5.3)
PROT SERPL-MCNC: 6.3 G/DL (ref 6.4–8.3)
RBC # BLD AUTO: 5.69 10E6/UL (ref 4.4–5.9)
SARS-COV-2 RNA RESP QL NAA+PROBE: NEGATIVE
SODIUM SERPL-SCNC: 136 MMOL/L (ref 136–145)
SODIUM SERPL-SCNC: 141 MMOL/L (ref 136–145)
WBC # BLD AUTO: 14.8 10E3/UL (ref 4–11)

## 2023-03-20 PROCEDURE — 74177 CT ABD & PELVIS W/CONTRAST: CPT

## 2023-03-20 PROCEDURE — 36415 COLL VENOUS BLD VENIPUNCTURE: CPT | Performed by: INTERNAL MEDICINE

## 2023-03-20 PROCEDURE — 82077 ASSAY SPEC XCP UR&BREATH IA: CPT | Performed by: INTERNAL MEDICINE

## 2023-03-20 PROCEDURE — U0005 INFEC AGEN DETEC AMPLI PROBE: HCPCS | Performed by: INTERNAL MEDICINE

## 2023-03-20 PROCEDURE — 250N000011 HC RX IP 250 OP 636: Performed by: INTERNAL MEDICINE

## 2023-03-20 PROCEDURE — 96361 HYDRATE IV INFUSION ADD-ON: CPT

## 2023-03-20 PROCEDURE — 36415 COLL VENOUS BLD VENIPUNCTURE: CPT | Performed by: EMERGENCY MEDICINE

## 2023-03-20 PROCEDURE — 96372 THER/PROPH/DIAG INJ SC/IM: CPT | Performed by: INTERNAL MEDICINE

## 2023-03-20 PROCEDURE — 99222 1ST HOSP IP/OBS MODERATE 55: CPT | Performed by: INTERNAL MEDICINE

## 2023-03-20 PROCEDURE — 80048 BASIC METABOLIC PNL TOTAL CA: CPT | Performed by: INTERNAL MEDICINE

## 2023-03-20 PROCEDURE — 82248 BILIRUBIN DIRECT: CPT | Performed by: INTERNAL MEDICINE

## 2023-03-20 PROCEDURE — 96374 THER/PROPH/DIAG INJ IV PUSH: CPT | Mod: 59

## 2023-03-20 PROCEDURE — G0378 HOSPITAL OBSERVATION PER HR: HCPCS

## 2023-03-20 PROCEDURE — 85027 COMPLETE CBC AUTOMATED: CPT | Performed by: EMERGENCY MEDICINE

## 2023-03-20 PROCEDURE — 96375 TX/PRO/DX INJ NEW DRUG ADDON: CPT

## 2023-03-20 PROCEDURE — 258N000003 HC RX IP 258 OP 636: Performed by: INTERNAL MEDICINE

## 2023-03-20 PROCEDURE — 250N000013 HC RX MED GY IP 250 OP 250 PS 637: Performed by: INTERNAL MEDICINE

## 2023-03-20 PROCEDURE — 258N000003 HC RX IP 258 OP 636: Performed by: EMERGENCY MEDICINE

## 2023-03-20 PROCEDURE — 74018 RADEX ABDOMEN 1 VIEW: CPT

## 2023-03-20 PROCEDURE — 87506 IADNA-DNA/RNA PROBE TQ 6-11: CPT | Performed by: INTERNAL MEDICINE

## 2023-03-20 PROCEDURE — 96376 TX/PRO/DX INJ SAME DRUG ADON: CPT

## 2023-03-20 PROCEDURE — 250N000011 HC RX IP 250 OP 636: Performed by: EMERGENCY MEDICINE

## 2023-03-20 PROCEDURE — 80053 COMPREHEN METABOLIC PANEL: CPT | Performed by: EMERGENCY MEDICINE

## 2023-03-20 RX ORDER — ACETAMINOPHEN 325 MG/1
650 TABLET ORAL EVERY 6 HOURS PRN
Status: DISCONTINUED | OUTPATIENT
Start: 2023-03-20 | End: 2023-03-21 | Stop reason: HOSPADM

## 2023-03-20 RX ORDER — METOPROLOL SUCCINATE 50 MG/1
50 TABLET, EXTENDED RELEASE ORAL DAILY
Status: DISCONTINUED | OUTPATIENT
Start: 2023-03-20 | End: 2023-03-21 | Stop reason: HOSPADM

## 2023-03-20 RX ORDER — IOPAMIDOL 755 MG/ML
100 INJECTION, SOLUTION INTRAVASCULAR ONCE
Status: COMPLETED | OUTPATIENT
Start: 2023-03-20 | End: 2023-03-20

## 2023-03-20 RX ORDER — METOPROLOL SUCCINATE 50 MG/1
50 TABLET, EXTENDED RELEASE ORAL DAILY
Status: DISCONTINUED | OUTPATIENT
Start: 2023-03-21 | End: 2023-03-20

## 2023-03-20 RX ORDER — AMOXICILLIN 250 MG
2 CAPSULE ORAL 2 TIMES DAILY PRN
Status: DISCONTINUED | OUTPATIENT
Start: 2023-03-20 | End: 2023-03-21 | Stop reason: HOSPADM

## 2023-03-20 RX ORDER — AMOXICILLIN 250 MG
1 CAPSULE ORAL 2 TIMES DAILY PRN
Status: DISCONTINUED | OUTPATIENT
Start: 2023-03-20 | End: 2023-03-21 | Stop reason: HOSPADM

## 2023-03-20 RX ORDER — PROCHLORPERAZINE MALEATE 5 MG
5 TABLET ORAL EVERY 6 HOURS PRN
Status: DISCONTINUED | OUTPATIENT
Start: 2023-03-20 | End: 2023-03-20

## 2023-03-20 RX ORDER — EZETIMIBE 10 MG/1
10 TABLET ORAL DAILY
Status: DISCONTINUED | OUTPATIENT
Start: 2023-03-20 | End: 2023-03-21 | Stop reason: HOSPADM

## 2023-03-20 RX ORDER — EPLERENONE 25 MG/1
25 TABLET, FILM COATED ORAL DAILY
Status: DISCONTINUED | OUTPATIENT
Start: 2023-03-21 | End: 2023-03-21 | Stop reason: HOSPADM

## 2023-03-20 RX ORDER — ACETAMINOPHEN 650 MG/1
650 SUPPOSITORY RECTAL EVERY 6 HOURS PRN
Status: DISCONTINUED | OUTPATIENT
Start: 2023-03-20 | End: 2023-03-21 | Stop reason: HOSPADM

## 2023-03-20 RX ORDER — ACETAMINOPHEN 650 MG/1
650 SUPPOSITORY RECTAL EVERY 6 HOURS PRN
Status: DISCONTINUED | OUTPATIENT
Start: 2023-03-20 | End: 2023-03-20

## 2023-03-20 RX ORDER — LIDOCAINE 40 MG/G
CREAM TOPICAL
Status: DISCONTINUED | OUTPATIENT
Start: 2023-03-20 | End: 2023-03-21 | Stop reason: HOSPADM

## 2023-03-20 RX ORDER — ENOXAPARIN SODIUM 100 MG/ML
40 INJECTION SUBCUTANEOUS EVERY 24 HOURS
Status: DISCONTINUED | OUTPATIENT
Start: 2023-03-20 | End: 2023-03-21 | Stop reason: HOSPADM

## 2023-03-20 RX ORDER — BISACODYL 10 MG
10 SUPPOSITORY, RECTAL RECTAL DAILY PRN
Status: DISCONTINUED | OUTPATIENT
Start: 2023-03-20 | End: 2023-03-21 | Stop reason: HOSPADM

## 2023-03-20 RX ORDER — PROCHLORPERAZINE 25 MG
12.5 SUPPOSITORY, RECTAL RECTAL EVERY 12 HOURS PRN
Status: DISCONTINUED | OUTPATIENT
Start: 2023-03-20 | End: 2023-03-21 | Stop reason: HOSPADM

## 2023-03-20 RX ORDER — FUROSEMIDE 20 MG
20 TABLET ORAL DAILY
Status: DISCONTINUED | OUTPATIENT
Start: 2023-03-21 | End: 2023-03-21 | Stop reason: HOSPADM

## 2023-03-20 RX ORDER — CALCIUM CARBONATE 500 MG/1
1000 TABLET, CHEWABLE ORAL 4 TIMES DAILY PRN
Status: DISCONTINUED | OUTPATIENT
Start: 2023-03-20 | End: 2023-03-21 | Stop reason: HOSPADM

## 2023-03-20 RX ORDER — EZETIMIBE 10 MG/1
10 TABLET ORAL DAILY
COMMUNITY

## 2023-03-20 RX ORDER — PROCHLORPERAZINE 25 MG
12.5 SUPPOSITORY, RECTAL RECTAL EVERY 12 HOURS PRN
Status: DISCONTINUED | OUTPATIENT
Start: 2023-03-20 | End: 2023-03-20

## 2023-03-20 RX ORDER — SODIUM CHLORIDE, SODIUM LACTATE, POTASSIUM CHLORIDE, CALCIUM CHLORIDE 600; 310; 30; 20 MG/100ML; MG/100ML; MG/100ML; MG/100ML
INJECTION, SOLUTION INTRAVENOUS CONTINUOUS
Status: DISCONTINUED | OUTPATIENT
Start: 2023-03-20 | End: 2023-03-21 | Stop reason: HOSPADM

## 2023-03-20 RX ORDER — ACETAMINOPHEN 325 MG/1
650 TABLET ORAL EVERY 6 HOURS PRN
Status: DISCONTINUED | OUTPATIENT
Start: 2023-03-20 | End: 2023-03-20

## 2023-03-20 RX ORDER — NITROGLYCERIN 0.4 MG/1
0.4 TABLET SUBLINGUAL EVERY 5 MIN PRN
Status: DISCONTINUED | OUTPATIENT
Start: 2023-03-20 | End: 2023-03-21 | Stop reason: HOSPADM

## 2023-03-20 RX ORDER — PROCHLORPERAZINE MALEATE 5 MG
5 TABLET ORAL EVERY 6 HOURS PRN
Status: DISCONTINUED | OUTPATIENT
Start: 2023-03-20 | End: 2023-03-21 | Stop reason: HOSPADM

## 2023-03-20 RX ORDER — ROSUVASTATIN CALCIUM 10 MG/1
10 TABLET, COATED ORAL DAILY
Status: DISCONTINUED | OUTPATIENT
Start: 2023-03-20 | End: 2023-03-21 | Stop reason: HOSPADM

## 2023-03-20 RX ORDER — DOXYCYCLINE 100 MG/1
100 CAPSULE ORAL 2 TIMES DAILY
COMMUNITY
End: 2023-03-21

## 2023-03-20 RX ORDER — METOCLOPRAMIDE HYDROCHLORIDE 5 MG/ML
10 INJECTION INTRAMUSCULAR; INTRAVENOUS ONCE
Status: COMPLETED | OUTPATIENT
Start: 2023-03-20 | End: 2023-03-20

## 2023-03-20 RX ORDER — EPLERENONE 25 MG/1
25 TABLET, FILM COATED ORAL DAILY
COMMUNITY

## 2023-03-20 RX ORDER — PANTOPRAZOLE SODIUM 40 MG/1
40 TABLET, DELAYED RELEASE ORAL
Status: DISCONTINUED | OUTPATIENT
Start: 2023-03-20 | End: 2023-03-21 | Stop reason: HOSPADM

## 2023-03-20 RX ORDER — METOPROLOL SUCCINATE 50 MG/1
50 TABLET, EXTENDED RELEASE ORAL DAILY
COMMUNITY

## 2023-03-20 RX ADMIN — SODIUM CHLORIDE 1000 ML: 9 INJECTION, SOLUTION INTRAVENOUS at 00:05

## 2023-03-20 RX ADMIN — ACETAMINOPHEN 650 MG: 325 TABLET ORAL at 14:54

## 2023-03-20 RX ADMIN — ROSUVASTATIN CALCIUM 10 MG: 10 TABLET, FILM COATED ORAL at 09:30

## 2023-03-20 RX ADMIN — PROMETHAZINE HYDROCHLORIDE 12.5 MG: 25 INJECTION INTRAMUSCULAR; INTRAVENOUS at 00:39

## 2023-03-20 RX ADMIN — PROCHLORPERAZINE EDISYLATE 5 MG: 5 INJECTION INTRAMUSCULAR; INTRAVENOUS at 19:09

## 2023-03-20 RX ADMIN — PROCHLORPERAZINE EDISYLATE 5 MG: 5 INJECTION INTRAMUSCULAR; INTRAVENOUS at 06:25

## 2023-03-20 RX ADMIN — METOPROLOL SUCCINATE 50 MG: 50 TABLET, EXTENDED RELEASE ORAL at 15:24

## 2023-03-20 RX ADMIN — SODIUM CHLORIDE 1000 ML: 9 INJECTION, SOLUTION INTRAVENOUS at 05:52

## 2023-03-20 RX ADMIN — ENOXAPARIN SODIUM 40 MG: 40 INJECTION SUBCUTANEOUS at 09:30

## 2023-03-20 RX ADMIN — METOCLOPRAMIDE 10 MG: 5 INJECTION, SOLUTION INTRAMUSCULAR; INTRAVENOUS at 01:47

## 2023-03-20 RX ADMIN — SODIUM CHLORIDE, POTASSIUM CHLORIDE, SODIUM LACTATE AND CALCIUM CHLORIDE: 600; 310; 30; 20 INJECTION, SOLUTION INTRAVENOUS at 19:09

## 2023-03-20 RX ADMIN — EZETIMIBE 10 MG: 10 TABLET ORAL at 09:30

## 2023-03-20 RX ADMIN — PANTOPRAZOLE SODIUM 40 MG: 40 TABLET, DELAYED RELEASE ORAL at 09:30

## 2023-03-20 RX ADMIN — SODIUM CHLORIDE, POTASSIUM CHLORIDE, SODIUM LACTATE AND CALCIUM CHLORIDE: 600; 310; 30; 20 INJECTION, SOLUTION INTRAVENOUS at 09:34

## 2023-03-20 RX ADMIN — PROMETHAZINE HYDROCHLORIDE 12.5 MG: 25 INJECTION INTRAMUSCULAR; INTRAVENOUS at 03:56

## 2023-03-20 RX ADMIN — Medication 1 MG: at 20:49

## 2023-03-20 RX ADMIN — IOPAMIDOL 100 ML: 755 INJECTION, SOLUTION INTRAVENOUS at 16:38

## 2023-03-20 RX ADMIN — SODIUM CHLORIDE 1000 ML: 9 INJECTION, SOLUTION INTRAVENOUS at 01:47

## 2023-03-20 ASSESSMENT — ACTIVITIES OF DAILY LIVING (ADL)
ADLS_ACUITY_SCORE: 35
DEPENDENT_IADLS:: INDEPENDENT
ADLS_ACUITY_SCORE: 35
ADLS_ACUITY_SCORE: 39
ADLS_ACUITY_SCORE: 39

## 2023-03-20 NOTE — ED NOTES
Pt has been up to bedside commode approx 5-6 times for loose stool. He has not had any emesis since arrival.

## 2023-03-20 NOTE — ED NOTES
Pt c/o nausea again- he has had no emesis since arrival but states that he feels very nauseated. MD updated.

## 2023-03-20 NOTE — PHARMACY-ADMISSION MEDICATION HISTORY
Pharmacy Note - Admission Medication History    Pertinent Provider Information: Patient not sure how many days he has left of doxycycline      ______________________________________________________________________    Prior To Admission (PTA) med list completed and updated in EMR.       PTA Med List   Medication Sig Note Last Dose     acetaminophen (TYLENOL) 325 MG tablet [ACETAMINOPHEN (TYLENOL) 325 MG TABLET] Take 2 tablets (650 mg total) by mouth every 4 (four) hours as needed.  Past Month at prn     doxycycline hyclate (VIBRAMYCIN) 100 MG capsule Take 100 mg by mouth 2 times daily X 10 days filled on 3/10/23- patient states he has a few days left  3/19/2023 at am     eplerenone (INSPRA) 25 MG tablet Take 25 mg by mouth daily  3/19/2023 at am     ezetimibe (ZETIA) 10 MG tablet Take 10 mg by mouth daily  3/19/2023 at am     furosemide (LASIX) 20 MG tablet [FUROSEMIDE (LASIX) 20 MG TABLET] TAKE ONE TABLET BY MOUTH ONCE DAILY  3/19/2023 at am     metoprolol succinate ER (TOPROL XL) 50 MG 24 hr tablet Take 50 mg by mouth daily  3/19/2023 at am     nitroglycerin (NITROSTAT) 0.4 MG SL tablet [NITROGLYCERIN (NITROSTAT) 0.4 MG SL TABLET] Place 1 tablet (0.4 mg total) under the tongue every 5 (five) minutes as needed for chest pain.  Unknown at prn     omeprazole (PRILOSEC) 20 MG capsule [OMEPRAZOLE (PRILOSEC) 20 MG CAPSULE] Take 20 mg by mouth daily before breakfast.  3/19/2023 at am     PRALUENT PEN 75 mg/mL PnIj [PRALUENT PEN 75 MG/ML PNIJ] Inject 75 mg under the skin every 14 (fourteen) days.  3/20/2023: Takes on the 1st and 15th of every month Past Week     rosuvastatin (CRESTOR) 10 MG tablet Take 10 mg by mouth daily  3/19/2023 at am       Information source(s): Patient and CareEverywhere/SureScripts  Method of interview communication: in-person    Summary of Changes to PTA Med List  New: doxy, eplerenone, zetia, metoprolol   Discontinued: -losartan, trazodone, aspirin, docusate   Changed: rosuvastatin    Patient was  asked about OTC/herbal products specifically.  PTA med list reflects this.    In the past week, patient estimated taking medication this percent of the time:  greater than 90%.      Allergies were reviewed, assessed, and updated with the patient.      Patient does not use any multi-dose medications prior to admission.    The information provided in this note is only as accurate as the sources available at the time of the update(s).    Thank you for the opportunity to participate in the care of this patient.    SONDRA BURKETT RPH  3/20/2023 8:38 AM

## 2023-03-20 NOTE — H&P
"[facility]    History and Physical - Hospitalist Service       Date of Admission:  3/19/2023    Assessment & Plan   Ayan Watkins is a 67 year old male with history of hepatitis B infection, hyperlipidemia, CAD status post CABG, hypertension, anxiety disorder, and hepatic steatosis admitted on 3/20/2023 due to diarrhea, intractable nausea and vomiting.    Suspected gastroenteritis  He received 3 L of IV fluid in the ER.  Gentle IV hydration  Clear liquid diet as tolerated  Follow-up abdominal CT scan  Follow-up stool testing for enteric pathogens  Antiemetics as needed  Analgesics as needed  Will consider starting antibiotic therapy depending on investigation findings     Hepatitis B infection  Transaminases are elevated with  with .  Monitor LFTs  Avoid hepatotoxins  Outpatient follow-up at the GI clinic  Check alcohol level    CAD status post CABG  Ezetimibe 10 mg daily  Rosuvastatin 10 mg.  Metoprolol succinate 50 mg daily  Nitroglycerin as needed      Hyperlipidemia  Resume PTA rosuvastatin  Ezetimibe 10 mg daily    Hypertension  Blood pressure is controlled  Resume PTA metoprolol succinate and eplerenone tomorrow        Diet: Clear Liquid Diet    DVT Prophylaxis: Enoxaparin (Lovenox) SQ  [place] Catheter: Not present  Lines: None     Cardiac Monitoring: None  Code Status: Full Code      Clinically Significant Risk Factors Present on Admission                  # Hypertension: home medication list includes antihypertensive(s)      # Overweight: Estimated body mass index is 29.79 kg/m  as calculated from the following:    Height as of this encounter: 1.651 m (5' 5\").    Weight as of this encounter: 81.2 kg (179 lb).           Disposition Plan      Expected Discharge Date: 03/21/2023                  Alyson Masters MD  Hospitalist Service  [facility]  Securely message with [employer] (more info)  Text page via [facility] Paging/Directory "     ______________________________________________________________________    Chief Complaint   Intractable  nausea, vomiting and diarrhea.    History is obtained from the patient    History of Present Illness   Ayan Watkins is a 67 year old male with history of hepatitis B infection, hyperlipidemia, CAD status post CABG, hypertension, anxiety disorder, and hepatic steatosis who presents to the ER due to diarrhea, intractable nausea and vomiting.    He was in his usual state of health until yesterday afternoon when he developed intractable nausea and vomiting associated with diarrhea and periumbilical/right upper quadrant abdominal pain.  Symptoms started after eating pickled herring fish bouth from a restaurant.  He denies fever, chills, nausea or vomiting.  He has had about 5 episodes of diarrhea so far this morning.    Initial blood pressure was 144/97, pulse 124, respiratory rate 18, temperature 98.4  F and oxygen saturation of 94% on room air.  Notable laboratory findings include WBC 14.8 and calcium 8.5 otherwise unremarkable CBC and BMP.  Rapid COVID-19 test was negative.    In the ER, he received normal saline 1 L bolus x3, metoclopramide 10 mg IV, prochlorperazine 5 mg IV x1 and promethazine 12.5 mg IV x2 in the ER.    Past Medical History    History reviewed. No pertinent past medical history.    Past Surgical History   History reviewed. No pertinent surgical history.    Prior to Admission Medications   Prior to Admission Medications   Prescriptions Last Dose Informant Patient Reported? Taking?   PRALUENT PEN 75 mg/mL PnIj   Yes No   Sig: [PRALUENT PEN 75 MG/ML PNIJ] Inject 75 mg under the skin every 14 (fourteen) days.    acetaminophen (TYLENOL) 325 MG tablet   No No   Sig: [ACETAMINOPHEN (TYLENOL) 325 MG TABLET] Take 2 tablets (650 mg total) by mouth every 4 (four) hours as needed.   aspirin 81 MG EC tablet   Yes No   Sig: [ASPIRIN 81 MG EC TABLET] Take 81 mg by mouth daily.   docusate sodium  (COLACE) 100 MG capsule   No No   Sig: [DOCUSATE SODIUM (COLACE) 100 MG CAPSULE] TAKE ONE CAPSULE BY MOUTH TWO TIMES A DAY.   furosemide (LASIX) 20 MG tablet   No No   Sig: [FUROSEMIDE (LASIX) 20 MG TABLET] TAKE ONE TABLET BY MOUTH ONCE DAILY   losartan (COZAAR) 100 MG tablet   Yes No   Sig: [LOSARTAN (COZAAR) 100 MG TABLET] Take 100 mg by mouth daily.   multivitamin (ONE A DAY) per tablet   Yes No   Sig: [MULTIVITAMIN (ONE A DAY) PER TABLET] Take 1 tablet by mouth daily.    nitroglycerin (NITROSTAT) 0.4 MG SL tablet   No No   Sig: [NITROGLYCERIN (NITROSTAT) 0.4 MG SL TABLET] Place 1 tablet (0.4 mg total) under the tongue every 5 (five) minutes as needed for chest pain.   omeprazole (PRILOSEC) 20 MG capsule   Yes No   Sig: [OMEPRAZOLE (PRILOSEC) 20 MG CAPSULE] Take 20 mg by mouth daily before breakfast.   rosuvastatin (CRESTOR) 5 MG tablet   Yes No   Sig: [ROSUVASTATIN (CRESTOR) 5 MG TABLET] Take 5 mg by mouth every other day.   sildenafil, antihypertensive, (REVATIO) 20 mg tablet   No No   Sig: [SILDENAFIL, ANTIHYPERTENSIVE, (REVATIO) 20 MG TABLET] TAKE 2 TO 5 TABLETS 1 HOUR PRIOR TO SEX , NO MORE THAN ONCE PER 24 HOURS.   traZODone (DESYREL) 50 MG tablet   No No   Sig: [TRAZODONE (DESYREL) 50 MG TABLET] TAKE 1 TABLET BY MOUTH AT BEDTIME AS NEEDED      Facility-Administered Medications: None        Review of Systems    The 10 point Review of Systems is negative other than noted in the HPI or here.   Physical Exam   Vital Signs: Temp: 98.4  F (36.9  C) Temp src: Oral BP: 130/71 Pulse: 117   Resp: 18 SpO2: 92 % O2 Device: None (Room air)    Weight: 179 lbs 0 oz    General appearance: Awake, Alert, Cooperative, not in any obvious distress and appears stated age   HEENT: Normocephalic, atraumatic, conjunctiva clear without icterus and ears without discharge  Lungs: Clear to auscultation bilaterally, no wheezing, good air exchange, normal work of breathing  Cardiovascular: Regular Rate and Rythm, normal apical impulse,  normal S1 and S2, no lower extremity edema bilaterally  Abdomen: Soft, periumbilical/right upper quadrant tenderness, non-distended, active bowel sounds  Skin: Skin color, texture normal and bruising or bleeding. No rashes or lesions over face, neck, arms and legs, turgor normal.  Musculoskeletal: No bony deformities or joint tenderness. Normal ROM upon flexion & extension.   Neurologic: Alert & Oriented X 3, Facial symmetry preserved and upper & lower extremities moving well with symmetry  Psychiatric: Calm, normal eye contact and normal affect      Medical Decision Making       60 MINUTES SPENT BY ME on the date of service doing chart review, history, exam, documentation & further activities per the note.      Data     I have personally reviewed the following data over the past 24 hrs:    14.8 (H)  \   17.1   / 173     141 106 16.1 /  144 (H)   3.4 22 0.84 \       ALT: 135 (H) AST: 286 (H) AP: 57 TBILI: 0.7   ALB: 4.1 TOT PROTEIN: 6.3 (L) LIPASE: N/A

## 2023-03-20 NOTE — CONSULTS
Care Management Initial Consult    General Information  Assessment completed with: Patient,    Type of CM/SW Visit: Initial Assessment    Primary Care Provider verified and updated as needed: Yes   Readmission within the last 30 days: no previous admission in last 30 days         Advance Care Planning: Advance Care Planning Reviewed:  (no HCD)          Communication Assessment  Patient's communication style: spoken language (English or Bilingual)             Cognitive  Cognitive/Neuro/Behavioral: speech, level of consciousness, orientation  Level of Consciousness: alert     Orientation: oriented x 4        Speech: clear    Living Environment:   People in home: parent(s)     Current living Arrangements: apartment      Able to return to prior arrangements: yes       Family/Social Support:  Care provided by: self  Provides care for: no one     Parent(s)          Description of Support System: Supportive         Current Resources:   Patient receiving home care services: No     Community Resources: None  Equipment currently used at home: none  Supplies currently used at home: None    Employment/Financial:  Employment Status: employed full-time        Financial Concerns:             Lifestyle & Psychosocial Needs:  Social Determinants of Health     Tobacco Use: Low Risk      Smoking Tobacco Use: Never     Smokeless Tobacco Use: Never     Passive Exposure: Not on file   Alcohol Use: Not on file   Financial Resource Strain: Not on file   Food Insecurity: Not on file   Transportation Needs: Not on file   Physical Activity: Not on file   Stress: Not on file   Social Connections: Not on file   Intimate Partner Violence: Not on file   Depression: Not on file   Housing Stability: Not on file       Functional Status:  Prior to admission patient needed assistance:   Dependent ADLs:: Independent  Dependent IADLs:: Independent         Additional Information:    Assessment completed with patient. Patient lives in an apartment he  shares with his mom. He is independent with ADLs and IADLs, ambulates without devices, drives and works full time. Transportation at discharge TBD.         Mara Hernandez RN

## 2023-03-20 NOTE — ED NOTES
Pt had large unmeasured loose stool while laying on cot. Linens changed, brief applied, and new gown given to patient. Pt was able to clean himself up with wet bath wipes. Pt currently laying on cot.

## 2023-03-20 NOTE — ED TRIAGE NOTES
"Pt brought in by EMS from home for evaluation for nausea and vomiting that began this afternoon around 1700. Pt states that he ate pickled herring around 1600 and approx one hour later began vomiting and having loose stools. EMS administered 8 mg Zofran and  ml bolus en route. Pt notes about 5 episodes of loose stool and \"probably fifty\" episodes of emesis.     Triage Assessment     Row Name 03/19/23 9662       Respiratory WDL    Respiratory WDL WDL       Skin Circulation/Temperature WDL    Skin Circulation/Temperature WDL WDL       Cognitive/Neuro/Behavioral WDL    Cognitive/Neuro/Behavioral WDL WDL              "

## 2023-03-20 NOTE — ED NOTES
Pt is now resting comfortably, after phenergan infusion. IVF infusing still- no additional requests at this time.

## 2023-03-20 NOTE — ED PROVIDER NOTES
EMERGENCY DEPARTMENT ENCOUnter      NAME: Ayan Watkins  AGE: 67 year old male  YOB: 1955  MRN: [id number]  EVALUATION DATE & TIME: 3/19/2023 11:45 PM    PCP: Tavo Stout    ED PROVIDER: Mane Jolley DO      Chief Complaint   Patient presents with     Vomiting         FINAL IMPRESSION:  1. Intractable nausea and vomiting          ED COURSE & MEDICAL DECISION MAKIN:49 PM I met with the patient, obtained history, performed an initial exam, and discussed options and plan for diagnostics and treatment here in the ED in room 18.   2:39 AM The nurse informs me that the patient is still nauseated and requesting water. He has not vomited yet here.  3:38 AM The patient is still feeling poorly after the second bag of fluids. He would still like water.  5:51 AM I spoke with the accepting hospitalist, Dr. Berry.      The patient presented to the emergency department today with vomiting and diarrhea since earlier today.  He was given IV fluids and multiple rounds of nausea medications without any improvement in his symptoms.  His work-up today has been unremarkable but given his ongoing symptoms we will plan to keep the patient in the hospital for further treatment.  He is comfortable with this plan.      Medical Decision Making    History:    Supplemental history from: EMS    External Record(s) reviewed: Documented in chart, if applicable.    Work Up:    Chart documentation includes differential considered and any EKGs or imaging independently interpreted by provider, where specified.    In additional to work up documented, I considered the following work up: Documented in chart, if applicable.    External consultation:    Discussion of management with another provider: Documented in chart, if applicable    Complicating factors:    Care impacted by chronic illness: Heart Disease    Care affected by social determinants of health: N/A    Disposition considerations: Admit.        At the  "conclusion of the encounter I discussed the results of all of the tests and the disposition. The questions were answered. The patient or family acknowledged understanding and was agreeable with the care plan.       =================================================================    HPI        Ayan Watkins is a 67 year old male with a pertinent history of CAD, hepatitis B, hyperlipidemia, hypertension, nonalcoholic fatty liver disease, and prediabetes who presents to this ED via EMS for evaluation of vomiting.    Earlier today the patient bought pickled herring and 1 hour after eating it began vomiting. He has been unable to leave the bathroom due to the vomiting and nausea. He now has abdominal pain from the vomiting and had a few episodes of diarrhea. When EMS arrived he was still nauseated so they gave 8 mg Zofran via IV and this has provided slight relief. His mouth feels very dry and his stomach is \"muscle crampy\". Upon EMS arrival his BP was normal, heart rate was in the 120s, and his blood glucose was 120.     He denies breathing issues or any other complaints at this time.     REVIEW OF SYSTEMS     Constitutional:  Denies fever or chills  HENT:  Denies sore throat. Positive for dry mouth.   Respiratory:  Denies cough or shortness of breath   Cardiovascular:  Denies chest pain or palpitations  GI:  Positive for abdominal pain, nausea, vomiting, and diarrhea.  Musculoskeletal:  Denies any new extremity pain   Skin:  Denies rash   Neurologic:  Denies headache, focal weakness or sensory changes    All other systems reviewed and are negative      PAST MEDICAL HISTORY:  History reviewed. No pertinent past medical history.    PAST SURGICAL HISTORY:  History reviewed. No pertinent surgical history.        CURRENT MEDICATIONS:    acetaminophen (TYLENOL) 325 MG tablet  eplerenone (INSPRA) 25 MG tablet  ezetimibe (ZETIA) 10 MG tablet  furosemide (LASIX) 20 MG tablet  metoprolol succinate ER (TOPROL XL) 50 MG 24 " hr tablet  nitroglycerin (NITROSTAT) 0.4 MG SL tablet  omeprazole (PRILOSEC) 20 MG capsule  PRALUENT PEN 75 mg/mL PnIj  rosuvastatin (CRESTOR) 10 MG tablet        ALLERGIES:  Allergies   Allergen Reactions     Atorvastatin Muscle Pain (Myalgia)     foot and ankle pain     Influenza Virus Vaccines [Influenza Vaccines] Unknown     Pt. unaware of reaction.      Ondansetron Hives     When given IV       FAMILY HISTORY:  History reviewed. No pertinent family history.    SOCIAL HISTORY:   Social History     Socioeconomic History     Marital status: Single     Spouse name: None     Number of children: None     Years of education: None     Highest education level: None   Tobacco Use     Smoking status: Never     Smokeless tobacco: Never   Substance and Sexual Activity     Alcohol use: Yes     Comment: Alcoholic Drinks/day: 2-3 drinks per week     Drug use: No     Sexual activity: Not Currently       VITALS:  No data found.    PHYSICAL EXAM    Constitutional:  Well developed, Well nourished,  HENT:  Normocephalic, Atraumatic, Oropharynx moist, Nose normal.   Eyes:  EOMI, Conjunctiva normal, No discharge.   Respiratory:  Normal breath sounds, No respiratory distress, No wheezing, No chest tenderness.   Cardiovascular:  Normal rhythm, No murmurs. Mildly tachycardic.  GI:  Soft, No tenderness, No guarding, No CVA tenderness.   Musculoskeletal:  No tenderness to palpation or major deformities noted.   Extremities: No lower extremity edema.  Neurologic:  Alert & oriented x 3, No focal deficits noted.   Psychiatric:  Affect normal, Judgment normal, Mood normal.        LAB:  All pertinent labs reviewed and interpreted.  Results for orders placed or performed during the hospital encounter of 03/19/23                         CBC with platelets   Result Value Ref Range    WBC Count 14.8 (H) 4.0 - 11.0 10e3/uL    RBC Count 5.69 4.40 - 5.90 10e6/uL    Hemoglobin 17.1 13.3 - 17.7 g/dL    Hematocrit 50.2 40.0 - 53.0 %    MCV 88 78 - 100  fL    MCH 30.1 26.5 - 33.0 pg    MCHC 34.1 31.5 - 36.5 g/dL    RDW 13.2 10.0 - 15.0 %    Platelet Count 173 150 - 450 10e3/uL   Basic metabolic panel   Result Value Ref Range    Sodium 136 136 - 145 mmol/L    Potassium 3.4 3.4 - 5.3 mmol/L    Chloride 101 98 - 107 mmol/L    Carbon Dioxide (CO2) 22 22 - 29 mmol/L    Anion Gap 13 7 - 15 mmol/L    Urea Nitrogen 19.1 8.0 - 23.0 mg/dL    Creatinine 0.85 0.67 - 1.17 mg/dL    Calcium 9.1 8.8 - 10.2 mg/dL    Glucose 156 (H) 70 - 99 mg/dL    GFR Estimate >90 >60 mL/min/1.73m2   Asymptomatic COVID-19 Virus (Coronavirus) by PCR Nasopharyngeal    Specimen: Nasopharyngeal; Swab   Result Value Ref Range    SARS CoV2 PCR Negative Negative         RADIOLOGY:  I have independently reviewed and interpreted the above imaging, pending the final radiology read.  CT Abdomen Pelvis w Contrast   Final Result   IMPRESSION:       1.  Mild fluid distention of the stomach without outlet obstruction. The small and large bowel are fluid and air-filled but not dilated. There is no focal wall thickening or inflammation in the mesentery. Findings are consistent with gastroenteritis as    clinically questioned.   2.  Hepatic steatosis.   3.  Diverticulosis of the colon but no diverticulitis.      XR Abdomen Port 1 View   Final Result   IMPRESSION: Nonobstructive bowel gas pattern. No definite free air. Sternotomy. Right total hip arthroplasty. Pelvic phleboliths. No definite renal stone. Spinal and left hip degenerative changes.            I, Alexx Lockwood, am serving as a scribe to document services personally performed by Dr. Jolley based on my observation and the provider's statements to me. I, Mane Jolley, DO attest that Alexx Lockwood is acting in a scribe capacity, has observed my performance of the services and has documented them in accordance with my direction.    Mane Jolley, DO  Emergency Medicine  [facility] EMERGENCY DEPARTMENT  [id number]  [facility] [address]  [phone]  Dept: [phone]     Mane Jolley MD  03/22/23 7429

## 2023-03-20 NOTE — ED NOTES
Pt continues to use bedside commode, up independently. Changed commode bag which contained large amount of watery yellow stool. Pt denies nausea and pain.

## 2023-03-21 ENCOUNTER — APPOINTMENT (OUTPATIENT)
Dept: PHYSICAL THERAPY | Facility: HOSPITAL | Age: 68
End: 2023-03-21
Attending: INTERNAL MEDICINE
Payer: COMMERCIAL

## 2023-03-21 VITALS
HEIGHT: 65 IN | BODY MASS INDEX: 29.82 KG/M2 | DIASTOLIC BLOOD PRESSURE: 63 MMHG | OXYGEN SATURATION: 93 % | WEIGHT: 179 LBS | SYSTOLIC BLOOD PRESSURE: 135 MMHG | RESPIRATION RATE: 18 BRPM | TEMPERATURE: 99.2 F | HEART RATE: 80 BPM

## 2023-03-21 PROBLEM — A08.11 NOROVIRUS: Status: ACTIVE | Noted: 2023-03-21

## 2023-03-21 LAB
ALBUMIN SERPL BCG-MCNC: 3.6 G/DL (ref 3.5–5.2)
ALP SERPL-CCNC: 45 U/L (ref 40–129)
ALT SERPL W P-5'-P-CCNC: 143 U/L (ref 10–50)
AST SERPL W P-5'-P-CCNC: 188 U/L (ref 10–50)
BILIRUB DIRECT SERPL-MCNC: <0.2 MG/DL (ref 0–0.3)
BILIRUB SERPL-MCNC: 0.4 MG/DL
C COLI+JEJUNI+LARI FUSA STL QL NAA+PROBE: NOT DETECTED
EC STX1 GENE STL QL NAA+PROBE: NOT DETECTED
EC STX2 GENE STL QL NAA+PROBE: NOT DETECTED
ERYTHROCYTE [DISTWIDTH] IN BLOOD BY AUTOMATED COUNT: 13.3 % (ref 10–15)
HCT VFR BLD AUTO: 45.5 % (ref 40–53)
HGB BLD-MCNC: 15.1 G/DL (ref 13.3–17.7)
HOLD SPECIMEN: NORMAL
MAGNESIUM SERPL-MCNC: 1.8 MG/DL (ref 1.7–2.3)
MCH RBC QN AUTO: 29.8 PG (ref 26.5–33)
MCHC RBC AUTO-ENTMCNC: 33.2 G/DL (ref 31.5–36.5)
MCV RBC AUTO: 90 FL (ref 78–100)
NOROV GI+II ORF1-ORF2 JNC STL QL NAA+PR: DETECTED
PLATELET # BLD AUTO: 114 10E3/UL (ref 150–450)
POTASSIUM SERPL-SCNC: 3.2 MMOL/L (ref 3.4–5.3)
POTASSIUM SERPL-SCNC: 3.3 MMOL/L (ref 3.4–5.3)
PROT SERPL-MCNC: 5.7 G/DL (ref 6.4–8.3)
RBC # BLD AUTO: 5.07 10E6/UL (ref 4.4–5.9)
RVA NSP5 STL QL NAA+PROBE: NOT DETECTED
SALMONELLA SP RPOD STL QL NAA+PROBE: NOT DETECTED
SHIGELLA SP+EIEC IPAH STL QL NAA+PROBE: NOT DETECTED
V CHOL+PARA RFBL+TRKH+TNAA STL QL NAA+PR: NOT DETECTED
WBC # BLD AUTO: 4 10E3/UL (ref 4–11)
Y ENTERO RECN STL QL NAA+PROBE: NOT DETECTED

## 2023-03-21 PROCEDURE — 84132 ASSAY OF SERUM POTASSIUM: CPT | Performed by: INTERNAL MEDICINE

## 2023-03-21 PROCEDURE — 96361 HYDRATE IV INFUSION ADD-ON: CPT

## 2023-03-21 PROCEDURE — 82040 ASSAY OF SERUM ALBUMIN: CPT | Performed by: INTERNAL MEDICINE

## 2023-03-21 PROCEDURE — 97161 PT EVAL LOW COMPLEX 20 MIN: CPT | Mod: GP

## 2023-03-21 PROCEDURE — 85014 HEMATOCRIT: CPT | Performed by: INTERNAL MEDICINE

## 2023-03-21 PROCEDURE — 83735 ASSAY OF MAGNESIUM: CPT | Performed by: INTERNAL MEDICINE

## 2023-03-21 PROCEDURE — 250N000013 HC RX MED GY IP 250 OP 250 PS 637: Performed by: INTERNAL MEDICINE

## 2023-03-21 PROCEDURE — G0378 HOSPITAL OBSERVATION PER HR: HCPCS

## 2023-03-21 PROCEDURE — 36415 COLL VENOUS BLD VENIPUNCTURE: CPT | Performed by: INTERNAL MEDICINE

## 2023-03-21 PROCEDURE — 97116 GAIT TRAINING THERAPY: CPT | Mod: GP

## 2023-03-21 PROCEDURE — 99239 HOSP IP/OBS DSCHRG MGMT >30: CPT | Performed by: INTERNAL MEDICINE

## 2023-03-21 PROCEDURE — 258N000003 HC RX IP 258 OP 636: Performed by: INTERNAL MEDICINE

## 2023-03-21 RX ORDER — POTASSIUM CHLORIDE 750 MG/1
10 TABLET, EXTENDED RELEASE ORAL ONCE
Status: COMPLETED | OUTPATIENT
Start: 2023-03-21 | End: 2023-03-21

## 2023-03-21 RX ORDER — POTASSIUM CHLORIDE 1500 MG/1
40 TABLET, EXTENDED RELEASE ORAL ONCE
Status: COMPLETED | OUTPATIENT
Start: 2023-03-21 | End: 2023-03-21

## 2023-03-21 RX ADMIN — PANTOPRAZOLE SODIUM 40 MG: 40 TABLET, DELAYED RELEASE ORAL at 07:13

## 2023-03-21 RX ADMIN — POTASSIUM CHLORIDE 10 MEQ: 750 TABLET, EXTENDED RELEASE ORAL at 14:45

## 2023-03-21 RX ADMIN — FUROSEMIDE 20 MG: 20 TABLET ORAL at 08:02

## 2023-03-21 RX ADMIN — SODIUM CHLORIDE, POTASSIUM CHLORIDE, SODIUM LACTATE AND CALCIUM CHLORIDE: 600; 310; 30; 20 INJECTION, SOLUTION INTRAVENOUS at 06:23

## 2023-03-21 RX ADMIN — EZETIMIBE 10 MG: 10 TABLET ORAL at 08:02

## 2023-03-21 RX ADMIN — METOPROLOL SUCCINATE 50 MG: 50 TABLET, EXTENDED RELEASE ORAL at 08:02

## 2023-03-21 RX ADMIN — POTASSIUM CHLORIDE 40 MEQ: 1500 TABLET, EXTENDED RELEASE ORAL at 10:25

## 2023-03-21 RX ADMIN — ROSUVASTATIN CALCIUM 10 MG: 10 TABLET, FILM COATED ORAL at 08:02

## 2023-03-21 ASSESSMENT — ACTIVITIES OF DAILY LIVING (ADL)
ADLS_ACUITY_SCORE: 39

## 2023-03-21 NOTE — PLAN OF CARE
PRIMARY DIAGNOSIS: GASTROENTERITIS    OUTPATIENT/OBSERVATION GOALS TO BE MET BEFORE DISCHARGE  1. Orthostatic performed: N/A    2. Tolerating PO fluid and/or antibiotics (if applicable): Yes    3. Nausea/Vomiting/Diarrhea symptoms improved: No,  approx 5-10 stools/day    4. Pain status: Pain free.    5. Return to near baseline physical activity: Yes    Discharge Planner Nurse   Safe discharge environment identified: Yes  Barriers to discharge: Yes       Entered by: Stefanie Kirby RN 03/21/2023 8:34 AM     Please review provider order for any additional goals.   Nurse to notify provider when observation goals have been met and patient is ready for discharge.

## 2023-03-21 NOTE — PLAN OF CARE
Problem: Gastroenteritis  Goal: Nausea and Vomiting Relief  Outcome: Progressing   Goal Outcome Evaluation:         Patient reported nausea x1. PRN IV compazine given and was effective. Loose BM x1, urine incontinence x1. Patient is independent with ambulation. LR running at 75mL/hr. Clear liquid diet. PRN melatonin given for sleep. Denies having pain.

## 2023-03-21 NOTE — PLAN OF CARE
Goal Outcome Evaluation:                      PRIMARY DIAGNOSIS: GASTROENTERITIS    OUTPATIENT/OBSERVATION GOALS TO BE MET BEFORE DISCHARGE  1. Orthostatic performed: N/A    2. Tolerating PO fluid and/or antibiotics (if applicable): Yes    3. Nausea/Vomiting/Diarrhea symptoms improved: Yes    4. Pain status: Pain free.    5. Return to near baseline physical activity: No    Discharge Planner Nurse   Safe discharge environment identified: Yes  Barriers to discharge: Yes Continued nausea and stools, improving       Entered by: Mary Bustillo RN 03/20/2023 10:44 PM     Please review provider order for any additional goals.   Nurse to notify provider when observation goals have been met and patient is ready for discharge.

## 2023-03-21 NOTE — PLAN OF CARE
PRIMARY DIAGNOSIS: GASTROENTERITIS    OUTPATIENT/OBSERVATION GOALS TO BE MET BEFORE DISCHARGE  1. Orthostatic performed: N/A    2. Tolerating PO fluid and/or antibiotics (if applicable): Yes    3. Nausea/Vomiting/Diarrhea symptoms improved: Yes    4. Pain status: Pain free.    5. Return to near baseline physical activity: Yes    Discharge Planner Nurse   Safe discharge environment identified: Yes  Barriers to discharge: No       Entered by: Stefanie Kirby RN 03/21/2023 1:18 PM     Please review provider order for any additional goals.   Nurse to notify provider when observation goals have been met and patient is ready for discharge.

## 2023-03-21 NOTE — DISCHARGE SUMMARY
[facility]  Hospitalist Discharge Summary      Date of Admission:  3/19/2023  Date of Discharge:  3/21/2023  Discharging Provider: Alyson Masters MD  Discharge Service: Hospitalist Service    Discharge Diagnoses   Gastroenteritis  Norovirus infection  Chronic hepatitis B infection    Follow-ups Needed After Discharge   Follow-up Appointments     Follow-up and recommended labs and tests       Follow up with primary care provider, Erlinda Jennings, within 7 days for   hospital follow- up.           Discharge Disposition   Discharged to home  Condition at discharge: Stable      Hospital Course   Ayan Watkins is a 67 year old male with history of hepatitis B infection, hyperlipidemia, CAD status post CABG, hypertension, anxiety disorder, and hepatic steatosis admitted on 3/20/2023 due to diarrhea, intractable nausea and vomiting.    CT scan of the abdomen and pelvis is consistent with gastroenteritis.  Enteric pathogen panel came back positive for norovirus.    He received IV fluid, analgesics and antiemetics during hospital stay.  Symptoms have improved remarkably and patient is eager to go home today.  He is clinically stable for discharge at this time.    Consultations This Hospital Stay    PHYSICAL THERAPY ADULT IP CONSULT  OCCUPATIONAL THERAPY ADULT IP CONSULT  CARE MANAGEMENT / SOCIAL WORK IP CONSULT  OCCUPATIONAL THERAPY ADULT IP CONSULT  PHYSICAL THERAPY ADULT IP CONSULT    Code Status   Full Code    Time Spent on this Encounter   I, Alyson Masters MD, personally saw the patient today and spent greater than 30 minutes discharging this patient.       Alyson Masters MD  [facility] EMERGENCY DEPARTMENT  [address] [place] [id number]  Phone: [phone]  ______________________________________________________________________    Physical Exam   Vital Signs: Temp: 99.2  F (37.3  C) Temp src: Oral BP: 135/63 Pulse: 80   Resp: 18 SpO2:  93 % O2 Device: None (Room air)    Weight: 179 lbs 0 oz    General appearance: Awake, Alert, Cooperative, not in any obvious distress and appears stated age   HEENT: Normocephalic, atraumatic, conjunctiva clear without icterus and ears without discharge  Lungs: Clear to auscultation bilaterally, no wheezing, good air exchange, normal work of breathing  Cardiovascular: Regular Rate and Rythm, normal apical impulse, normal S1 and S2, no lower extremity edema bilaterally  Abdomen: Soft, non-tender and Non-distended, active bowel sounds  Skin: Skin color, texture normal and bruising or bleeding. No rashes or lesions over face, neck, arms and legs, turgor normal.  Musculoskeletal: No bony deformities or joint tenderness. Normal ROM upon flexion & extension.   Neurologic: Alert & Oriented X 3, Facial symmetry preserved and upper & lower extremities moving well with symmetry  Psychiatric: Calm, normal eye contact and normal affect         Primary Care Physician   Erlinda Jennings    Discharge Orders      Reason for your hospital stay    Gastroenteritis  Norovirus infection     Follow-up and recommended labs and tests     Follow up with primary care provider, Erlinda Jennings, within 7 days for hospital follow- up.     Activity    Your activity upon discharge: activity as tolerated     Diet    Follow this diet upon discharge: Orders Placed This Encounter      Combination Diet Regular Diet       Significant Results and Procedures   Results for orders placed or performed during the hospital encounter of 03/19/23   XR Abdomen Port 1 View    Narrative    EXAM: XR ABDOMEN PORT 1 VIEW  LOCATION: [facility]  DATE/TIME: 3/20/2023 8:22 AM    INDICATION: intractable N and V, eval for signs of obstruction  COMPARISON: CT 11/16/2020      Impression    IMPRESSION: Nonobstructive bowel gas pattern. No definite free air. Sternotomy. Right total hip arthroplasty. Pelvic phleboliths. No definite renal stone. Spinal  and left hip degenerative changes.   CT Abdomen Pelvis w Contrast    Narrative    EXAM: CT ABDOMEN PELVIS W CONTRAST  LOCATION: [facility]  DATE/TIME: 3/20/2023 4:39 PM    INDICATION: Abdominal pain  COMPARISON: CT of the abdomen and pelvis 11/16/2020  TECHNIQUE: CT scan of the abdomen and pelvis was performed following injection of IV contrast. Multiplanar reformats were obtained. Dose reduction techniques were used.  CONTRAST: isovue 370  100ml    FINDINGS:   LOWER CHEST: A few linear bands of atelectasis are present in the lung bases.    HEPATOBILIARY: Diffuse hepatic steatosis. No significant mass. No bile duct dilatation. No calcified gallstones.    PANCREAS: Normal.    SPLEEN: Normal.    ADRENAL GLANDS: Stable adrenal glands without actionable nodule.    KIDNEYS/BLADDER: No significant mass, stones, or hydronephrosis. There are simple or benign cysts. No follow up is needed.    BOWEL: Stomach is mildly fluid distended but has a smooth wall of uniform thickness. The small and large bowel also contain fluid and air without focal wall thickening or inflammatory stranding in the mesentery. Normal appendix. Multiple diverticula   arise from the descending and sigmoid colon.    LYMPH NODES: Normal.    VASCULATURE: Patchy atheromatous calcifications of the infrarenal abdominal aorta and iliac arteries but no aneurysm.    PELVIC ORGANS: Normal.    MUSCULOSKELETAL: Status post right hip arthroplasty. There is moderate osteoarthrosis of the left hip. Slight anterior wedging of T11 and T12. Lumbar vertebra are maintained in height. Small lower thoracic and lumbar degenerative osteophytes. Sternal   wires are present. No body wall hernia.      Impression    IMPRESSION:     1.  Mild fluid distention of the stomach without outlet obstruction. The small and large bowel are fluid and air-filled but not dilated. There is no focal wall thickening or inflammation in the mesentery. Findings are consistent  with gastroenteritis as   clinically questioned.  2.  Hepatic steatosis.  3.  Diverticulosis of the colon but no diverticulitis.       Discharge Medications   Current Discharge Medication List      CONTINUE these medications which have NOT CHANGED    Details   acetaminophen (TYLENOL) 325 MG tablet [ACETAMINOPHEN (TYLENOL) 325 MG TABLET] Take 2 tablets (650 mg total) by mouth every 4 (four) hours as needed.  Refills: 0    Associated Diagnoses: Nausea vomiting and diarrhea; Lower abdominal pain      eplerenone (INSPRA) 25 MG tablet Take 25 mg by mouth daily      ezetimibe (ZETIA) 10 MG tablet Take 10 mg by mouth daily      furosemide (LASIX) 20 MG tablet [FUROSEMIDE (LASIX) 20 MG TABLET] TAKE ONE TABLET BY MOUTH ONCE DAILY  Qty: 30 tablet, Refills: 0    Comments: Edvin needs to make an appointment soon      metoprolol succinate ER (TOPROL XL) 50 MG 24 hr tablet Take 50 mg by mouth daily      nitroglycerin (NITROSTAT) 0.4 MG SL tablet [NITROGLYCERIN (NITROSTAT) 0.4 MG SL TABLET] Place 1 tablet (0.4 mg total) under the tongue every 5 (five) minutes as needed for chest pain.  Qty: 100 tablet, Refills: 0      omeprazole (PRILOSEC) 20 MG capsule [OMEPRAZOLE (PRILOSEC) 20 MG CAPSULE] Take 20 mg by mouth daily before breakfast.      PRALUENT PEN 75 mg/mL PnIj [PRALUENT PEN 75 MG/ML PNIJ] Inject 75 mg under the skin every 14 (fourteen) days.       rosuvastatin (CRESTOR) 10 MG tablet Take 10 mg by mouth daily         STOP taking these medications       doxycycline hyclate (VIBRAMYCIN) 100 MG capsule Comments:   Reason for Stopping:             Allergies   Allergies   Allergen Reactions     Atorvastatin Muscle Pain (Myalgia)     foot and ankle pain     Influenza Virus Vaccines [Influenza Vaccines] Unknown     Pt. unaware of reaction.      Ondansetron Hives     When given IV

## 2023-03-21 NOTE — PROGRESS NOTES
Physical Therapy Discharge Summary    Reason for therapy discharge:    All goals and outcomes met, no further needs identified.    Progress towards therapy goal(s). See goals on Care Plan in [facility] electronic health record for goal details.  Goals met    Therapy recommendation(s):    No further therapy is recommended.       03/21/23 0835   Appointment Info   Signing Clinician's Name / Credentials (PT) Yenny Velez DPT   Quick Adds   Quick Adds Certification   Living Environment   People in Home parent(s)  (Mother)   Current Living Arrangements house   Home Accessibility no concerns   Transportation Anticipated family or friend will provide   Self-Care   Usual Activity Tolerance excellent   Current Activity Tolerance good   Equipment Currently Used at Home none   General Information   Onset of Illness/Injury or Date of Surgery 03/19/23   Referring Physician Alyson Masters MD   Patient/Family Therapy Goals Statement (PT) return home   Pertinent History of Current Problem (include personal factors and/or comorbidities that impact the POC) 67 year old male with history of hepatitis B infection, hyperlipidemia, CAD status post CABG, hypertension, anxiety disorder, and hepatic steatosis admitted on 3/20/2023 due to diarrhea, intractable nausea and vomiting.   Existing Precautions/Restrictions no known precautions/restrictions   Strength (Manual Muscle Testing)   Strength (Manual Muscle Testing) No deficits observed during functional mobility   Bed Mobility   Bed Mobility no deficits identified   Transfers   Transfers sit-stand transfer   Sit-Stand Transfer   Sit-Stand [place] (Transfers) supervision   Gait/Stairs (Locomotion)   [place] Level (Gait) supervision   Assistive Device (Gait)   (pushing IV pole)   Distance in Feet 75'   Distance in Feet (Gait) 300'   Pattern (Gait) step-through   Deviations/Abnormal Patterns (Gait) gait speed decreased   Clinical Impression   Criteria for Skilled Therapeutic  Intervention Yes, treatment indicated   PT Diagnosis (PT) Impaired functional mobility   Influenced by the following impairments Fatigue   Functional limitations due to impairments Impaired gait   Clinical Presentation (PT Evaluation Complexity) Stable/Uncomplicated   Clinical Presentation Rationale Presents as diagnosed   Clinical Decision Making (Complexity) low complexity   Planned Therapy Interventions (PT) gait training   Anticipated Equipment Needs at Discharge (PT)   (none)   Risk & Benefits of therapy have been explained patient   PT Total Evaluation Time   PT Eval, Low Complexity Minutes (06551) 10   Therapy Certification   Start of care date 03/21/23   Certification date from 03/21/23   Certification date to 03/28/23   Medical Diagnosis Nororvirus   Physical Therapy Goals   PT Frequency One time eval and treatment only   PT Predicted Duration/Target Date for Goal Attainment 03/21/23   PT Goals Gait   PT: Gait Independent;Greater than 200 feet;Goal Met   Interventions   Interventions Quick Adds Gait Training   Gait Training   Gait Training Minutes (20052) 8   Symptoms Noted During/After Treatment (Gait Training) fatigue   Treatment Detail/Skilled Intervention Cues for safety and slowing down as needed due IV pole and lines. Initial 300' pushing IV pole, slow steady gait. Last 75' with no AD. Noted feeling fatigued, but at baseline mobility.   [place] Level (Gait Training) independent   Physical Assistance Level (Gait Training) supervision   Weight Bearing (Gait Training) weight-bearing as tolerated   Assistive Device (Gait Training)   (none)   Gait Analysis Deviations decreased krishna   PT Discharge Planning   PT Discharge Recommendation (DC Rec) home   PT Rationale for DC Rec Pt goals met.   PT Brief overview of current status Amb 375' independently with no AD.   Total Session Time   Timed Code Treatment Minutes 8   Total Session Time (sum of timed and untimed services) 18       [facility]  Rehabilitation Services  OUTPATIENT PHYSICAL THERAPY EVALUATION  PLAN OF TREATMENT FOR OUTPATIENT REHABILITATION  (COMPLETE FOR INITIAL CLAIMS ONLY)  Patient's Last Name, First Name, M.I.  YOB: 1955  Ayan Watkins                        Provider's Name  [facility] Medical Record No.  [id number]                             Onset Date:  03/19/23   Start of Care Date:  (P) 03/21/23   Type:     _X_PT   ___OT   ___SLP Medical Diagnosis:  (P) Nororvirus              PT Diagnosis:  Impaired functional mobility Visits from SOC:  1     See note for plan of treatment, functional goals and certification details    I CERTIFY THE NEED FOR THESE SERVICES FURNISHED UNDER        THIS PLAN OF TREATMENT AND WHILE UNDER MY CARE     (Physician co-signature of this document indicates review and certification of the therapy plan).                Yenny Velez, PT, DPT  3/21/2023

## 2023-03-21 NOTE — PLAN OF CARE
Problem: Gastroenteritis  Goal: Effective Diarrhea Management  3/21/2023 0654 by Prisca Madrid, RN  Outcome: Not Progressing  3/21/2023 0508 by Prisca Madrid, RN  Outcome: Progressing   Goal Outcome Evaluation:  After previous note, Ayan has had 3 loose stools.

## 2023-03-21 NOTE — PLAN OF CARE
Problem: Gastroenteritis  Goal: Fluid and Electrolyte Balance  Outcome: Progressing   Goal Outcome Evaluation:  He is tolerating oral fluids without nausea.  His IV fluids continue at 75/hour.  His Mg & K will be checked this morning.  Problem: Gastroenteritis  Goal: Effective Diarrhea Management  Outcome: Progressing   He's had 2 small loose stools.  Problem: Gastroenteritis  Goal: Nausea and Vomiting Relief  Outcome: Progressing   He had compazine at 8pm & has denied needing any more.